# Patient Record
Sex: FEMALE | Race: WHITE | Employment: UNEMPLOYED | URBAN - METROPOLITAN AREA
[De-identification: names, ages, dates, MRNs, and addresses within clinical notes are randomized per-mention and may not be internally consistent; named-entity substitution may affect disease eponyms.]

---

## 2019-04-22 ENCOUNTER — IP HISTORICAL/CONVERTED ENCOUNTER (OUTPATIENT)
Dept: OTHER | Age: 24
End: 2019-04-22

## 2020-06-16 LAB — PAP SMEAR, EXTERNAL: NORMAL

## 2020-07-29 ENCOUNTER — NURSE TRIAGE (OUTPATIENT)
Dept: OBGYN CLINIC | Age: 25
End: 2020-07-29

## 2020-07-29 NOTE — TELEPHONE ENCOUNTER
Patient called stating she stopped getting the Depo Provera injection about 2 months ago and hasn't restarted her cycle. States she took a negative home pregnancy test yesterday. Per Dr Pal Laguna nurse, patient advised to see if her cycle will restart within the next month and if not call our office back.

## 2020-08-15 PROBLEM — F41.9 ANXIETY: Status: ACTIVE | Noted: 2020-08-15

## 2020-08-15 PROBLEM — R87.619 ABNORMAL PAP SMEAR OF CERVIX: Status: ACTIVE | Noted: 2020-08-15

## 2020-08-15 RX ORDER — MEDROXYPROGESTERONE ACETATE 150 MG/ML
150 INJECTION, SUSPENSION INTRAMUSCULAR
COMMUNITY
End: 2021-03-09

## 2020-08-15 RX ORDER — FLUTICASONE PROPIONATE 50 MCG
SPRAY, SUSPENSION (ML) NASAL
COMMUNITY

## 2020-08-17 ENCOUNTER — OFFICE VISIT (OUTPATIENT)
Dept: OBGYN CLINIC | Age: 25
End: 2020-08-17
Payer: COMMERCIAL

## 2020-08-17 DIAGNOSIS — N91.2 AMENORRHEA: Primary | ICD-10-CM

## 2020-08-17 DIAGNOSIS — R87.612 LGSIL ON PAP SMEAR OF CERVIX: ICD-10-CM

## 2020-08-17 LAB
HCG URINE, QL. (POC): NEGATIVE
VALID INTERNAL CONTROL?: YES

## 2020-08-17 PROCEDURE — 81025 URINE PREGNANCY TEST: CPT | Performed by: OBSTETRICS & GYNECOLOGY

## 2020-08-17 PROCEDURE — 99213 OFFICE O/P EST LOW 20 MIN: CPT | Performed by: OBSTETRICS & GYNECOLOGY

## 2020-08-17 PROCEDURE — 57454 BX/CURETT OF CERVIX W/SCOPE: CPT | Performed by: OBSTETRICS & GYNECOLOGY

## 2020-08-17 NOTE — PROGRESS NOTES
Subjective:  Chief Complaints:        1. Recent abnormal pap smear. 2. Here for Colposcopy.:    HPI:         Ethan Conklin is a 21 y.o. female who came to today for colposcopy after abnormal pap smear findings. Patient is doing well today and has no complaints. ROS:  RESPIRATORY:     Negative for shortness of breath, chest pain, chest congestion, cough. CARDIOLOGY:    Negative for dizziness, chest pain, palpitations. FEMALE REPRODUCTIVE:    Negative for abnormal vaginal discharge, abnormal vaginal bleeding. UROLOGY:    Negative for difficulty urinating, voiding dysfunction, frequent urination, dysuria. Gyn History:    OB History:  OB History    Para Term  AB Living   2 2 2     2   SAB TAB Ectopic Molar Multiple Live Births             2      # Outcome Date GA Lbr Krsi/2nd Weight Sex Delivery Anes PTL Lv   2 Term 19    M Vag-Forceps   MARY   1 Term 02/20/15    M Vag-Forceps   MARY       Current Outpatient Medications   Medication Sig    fluticasone propionate (FLONASE) 50 mcg/actuation nasal spray fluticasone propionate 50 mcg/actuation nasal spray,suspension    medroxyPROGESTERone (DEPO-PROVERA) 150 mg/mL injection 150 mg by IntraMUSCular route. No current facility-administered medications for this visit. Objective:  Physical Examination:  GENERAL:     General Appearance: well-appearing, well-developed, no acute distress. Hygiene: unremarkable. Mental Status:  alert and oriented. Mood/Affect:  pleasant. Speech: unremarkable. HEART:     Rhythm:  regular. Rate:  normal.  LUNGS:     Auscultation:  CTA bilaterally, no wheezing/rhonchi/rales. ABDOMEN:       Guarding:  none. Tenderness:  none. Masses:  none palpable. Inguinal nodes:  not enlarged. Ascites:  none. Bowel sounds:  normoactive. Distention:  none. Rebound tenderness:  none. RECTUM:     Anus:  no fissures, unremarkable.   GENITOURINARY - FEMALE:     Vagina:  pink/moist mucosa, no gross evidence of lesions, no abnormal discharge. Cervix/cuff: normal appearance, no lesions/discharge/bleeding:  Colposcopy performed: see Procedure. External genitalia: normal.    Assessment:  The primary encounter diagnosis was Amenorrhea. A diagnosis of LGSIL on Pap smear of cervix was also pertinent to this visit. Plan:  Pap smear results reviewed with pt. Colposcopy done today. Discussed importance of strict followup and to avoid smoking: Depending on severity, followup may be every 4-6 months or sooner if higher grade and may need immediate treatment. Goal is to prevent progression to Cervical Cancer. Discussed immunes system boosters such as adequate sleep; daily multivitamins, diet rich in wholesome nutritional foods and antioxidants and safe sex practices. Patient expressed understanding; All questions answered. Procedures:  Colposcopy:  Informed consent Risk, complications, benefits and alternatives discussed with patient, Consent obtained, 30 second time out taken. Pregnancy status negative. Negative Pregnancy Test.  Colposcopy procedure Acetic Acid 4-6% solution applied to cervix, Endocervical Curreting was performed, Biopsy(ies) taken at 3 o'clock. ,Monsels paste applied to biopsy site(S),. Post Colposcopy Hemostasis was assured, Patient tolerated the procedure well, Instructed nothing in vagina for 4-6 days, Stressed importance of strict followup, Discussed importance of NOT SMOKING. Gardasil discussed in patients under 32years old, discussed that Gardasil (HPV Vaccine) can still offer protection against up to 70% of HPV types tht cause wqrts or cervical cancer. Orders Placed This Encounter    COLPOSC,CERVIX W/ADJ VAG,W/BX & CURRETAG    AMB POC URINE PREGNANCY TEST, VISUAL COLOR COMPARISON       Preventive:  If < 32years old, discussed HPV/Cervical CA prevention;  Implications of Gardasil Vaccine and May prevent infection of HPV types that cause 70% of warts or cervical dysplasias, however, Yearly PAPs with HPV DNA testing is still necessary. This can be inquired at [facility] Dept.; Discussed need to quit smoking if smoker. Discussed importance of strict followup PAPs and colposcopies as recommended.     Follow Up:

## 2020-08-27 LAB
CPT CODES, 490044: NORMAL
CPT DISCLAIMER: NORMAL
CYTOLOGY SPEC DOC CYTO: NORMAL
DIAGNOSIS SYNOPSIS:: NORMAL
DX ICD CODE: NORMAL
PATH REPORT.GROSS SPEC: NORMAL
PATHOLOGIST NAME: NORMAL
PDF IMAGE, 807507: NORMAL
SPECIMEN SOURCE: NORMAL

## 2020-08-28 ENCOUNTER — TELEPHONE (OUTPATIENT)
Dept: OBGYN CLINIC | Age: 25
End: 2020-08-28

## 2020-08-28 NOTE — TELEPHONE ENCOUNTER
----- Message from Juwan Lua MD sent at 8/28/2020  9:52 AM EDT -----  PLEASE NOTIFY PATIENT OF NORMAL RESULTS.

## 2021-02-08 ENCOUNTER — INITIAL PRENATAL (OUTPATIENT)
Dept: OBGYN CLINIC | Age: 26
End: 2021-02-08
Payer: COMMERCIAL

## 2021-02-08 VITALS
DIASTOLIC BLOOD PRESSURE: 70 MMHG | SYSTOLIC BLOOD PRESSURE: 124 MMHG | WEIGHT: 169 LBS | HEIGHT: 61 IN | BODY MASS INDEX: 31.91 KG/M2

## 2021-02-08 DIAGNOSIS — Z3A.11 11 WEEKS GESTATION OF PREGNANCY: ICD-10-CM

## 2021-02-08 DIAGNOSIS — O21.9 NAUSEA AND VOMITING IN PREGNANCY: ICD-10-CM

## 2021-02-08 DIAGNOSIS — Z34.91 NORMAL PREGNANCY IN FIRST TRIMESTER: ICD-10-CM

## 2021-02-08 DIAGNOSIS — N91.2 AMENORRHEA: Primary | ICD-10-CM

## 2021-02-08 PROCEDURE — 76801 OB US < 14 WKS SINGLE FETUS: CPT | Performed by: OBSTETRICS & GYNECOLOGY

## 2021-02-08 PROCEDURE — 0500F INITIAL PRENATAL CARE VISIT: CPT | Performed by: OBSTETRICS & GYNECOLOGY

## 2021-02-08 RX ORDER — PROMETHAZINE HYDROCHLORIDE 25 MG/1
25 TABLET ORAL
Qty: 20 TAB | Refills: 2 | Status: SHIPPED | OUTPATIENT
Start: 2021-02-08 | End: 2021-08-28

## 2021-02-08 NOTE — PROGRESS NOTES
Ketones:NEG // SG:NEG //  Lauretha Lesches //  Ph:NEG //  Nit:NEG // Leuk: NEG   Pt presents with complaints of fatigue and nausea and vomiting//OB PAP, OB PANEL TODAY//[place]

## 2021-02-11 LAB
C TRACH RRNA CVX QL NAA+PROBE: NEGATIVE
CYTOLOGIST CVX/VAG CYTO: NORMAL
CYTOLOGY CVX/VAG DOC CYTO: NORMAL
CYTOLOGY CVX/VAG DOC THIN PREP: NORMAL
DX ICD CODE: NORMAL
LABCORP, 190119: NORMAL
Lab: NORMAL
N GONORRHOEA RRNA CVX QL NAA+PROBE: NEGATIVE
OTHER STN SPEC: NORMAL
STAT OF ADQ CVX/VAG CYTO-IMP: NORMAL
T VAGINALIS RRNA SPEC QL NAA+PROBE: NEGATIVE

## 2021-02-12 LAB
ABO GROUP BLD: NORMAL
BASOPHILS # BLD AUTO: 0 X10E3/UL (ref 0–0.2)
BASOPHILS NFR BLD AUTO: 0 %
BLD GP AB SCN SERPL QL: NEGATIVE
CFTR MUT ANL BLD/T: NORMAL
EOSINOPHIL # BLD AUTO: 0.1 X10E3/UL (ref 0–0.4)
EOSINOPHIL NFR BLD AUTO: 1 %
ERYTHROCYTE [DISTWIDTH] IN BLOOD BY AUTOMATED COUNT: 12.7 % (ref 11.7–15.4)
GENE DIS ANL CARRIER INTERP-IMP: NORMAL
HBV SURFACE AG SERPL QL IA: NEGATIVE
HCT VFR BLD AUTO: 40.1 % (ref 34–46.6)
HCV AB S/CO SERPL IA: <0.1 S/CO RATIO (ref 0–0.9)
HCV AB SERPL QL IA: NORMAL
HGB A MFR BLD: 97.7 % (ref 96.4–98.8)
HGB A2 MFR BLD COLUMN CHROM: 2.3 % (ref 1.8–3.2)
HGB BLD-MCNC: 13.8 G/DL (ref 11.1–15.9)
HGB C MFR BLD: 0 %
HGB F MFR BLD: 0 % (ref 0–2)
HGB FRACT BLD-IMP: NORMAL
HGB OTHER MFR BLD HPLC: 0 %
HGB S BLD QL SOLY: NEGATIVE
HGB S MFR BLD: 0 %
HIV 1+2 AB+HIV1 P24 AG SERPL QL IA: NON REACTIVE
IMM GRANULOCYTES # BLD AUTO: 0 X10E3/UL (ref 0–0.1)
IMM GRANULOCYTES NFR BLD AUTO: 0 %
LYMPHOCYTES # BLD AUTO: 1.8 X10E3/UL (ref 0.7–3.1)
LYMPHOCYTES NFR BLD AUTO: 26 %
MCH RBC QN AUTO: 30.3 PG (ref 26.6–33)
MCHC RBC AUTO-ENTMCNC: 34.4 G/DL (ref 31.5–35.7)
MCV RBC AUTO: 88 FL (ref 79–97)
MONOCYTES # BLD AUTO: 0.4 X10E3/UL (ref 0.1–0.9)
MONOCYTES NFR BLD AUTO: 6 %
NEUTROPHILS # BLD AUTO: 4.6 X10E3/UL (ref 1.4–7)
NEUTROPHILS NFR BLD AUTO: 67 %
PLATELET # BLD AUTO: 248 X10E3/UL (ref 150–450)
RBC # BLD AUTO: 4.56 X10E6/UL (ref 3.77–5.28)
RH BLD: POSITIVE
RPR SER QL: NON REACTIVE
RUBV IGG SERPL IA-ACNC: 10.2 INDEX
WBC # BLD AUTO: 6.9 X10E3/UL (ref 3.4–10.8)

## 2021-03-10 ENCOUNTER — ROUTINE PRENATAL (OUTPATIENT)
Dept: OBGYN CLINIC | Age: 26
End: 2021-03-10
Payer: COMMERCIAL

## 2021-03-10 VITALS
HEART RATE: 120 BPM | DIASTOLIC BLOOD PRESSURE: 91 MMHG | SYSTOLIC BLOOD PRESSURE: 132 MMHG | WEIGHT: 143.13 LBS | TEMPERATURE: 97.1 F | BODY MASS INDEX: 27.02 KG/M2 | HEIGHT: 61 IN

## 2021-03-10 DIAGNOSIS — Z34.92 NORMAL PREGNANCY IN SECOND TRIMESTER: ICD-10-CM

## 2021-03-10 DIAGNOSIS — Z3A.15 15 WEEKS GESTATION OF PREGNANCY: ICD-10-CM

## 2021-03-10 DIAGNOSIS — Z36.1 ANTENATAL SCREENING FOR RAISED ALPHAFETOPROTEIN LEVEL: Primary | ICD-10-CM

## 2021-03-10 PROBLEM — Z34.90 PREGNANCY: Status: ACTIVE | Noted: 2021-03-10

## 2021-03-10 PROCEDURE — 0502F SUBSEQUENT PRENATAL CARE: CPT | Performed by: OBSTETRICS & GYNECOLOGY

## 2021-03-10 NOTE — PROGRESS NOTES
Ketones:NEG // SG:NEG //  Deborah Rand //  Ph:NEG //  Nit:NEG // Leuk: NEG   Pt presents with complaints of nausea and sinus infection, taking antibiotics from [facility]//AFP TODAY//[place]

## 2021-03-12 LAB
2ND TRIMESTER 4 SCREEN SERPL-IMP: NORMAL
2ND TRIMESTER 4 SCREEN SERPL-IMP: NORMAL
AFP ADJ MOM SERPL: 0.96
AFP SERPL-MCNC: 30.7 NG/ML
AGE AT DELIVERY: 26.8 YR
COMMENTS, 018014: NORMAL
FET TS 18 RISK FROM MAT AGE: NORMAL
FET TS 21 RISK FROM MAT AGE: 936
GA METHOD: NORMAL
GA: 15.5 WEEKS
HCG ADJ MOM SERPL: 0.68
HCG SERPL-ACNC: NORMAL MIU/ML
IDDM PATIENT QL: NO
INHIBIN A ADJ MOM SERPL: 0.56
INHIBIN A SERPL-MCNC: 98.21 PG/ML
MULTIPLE PREGNANCY: NO
NEURAL TUBE DEFECT RISK FETUS: NORMAL %
RESULTS, 017389: NORMAL
TS 18 RISK FETUS: NORMAL
TS 21 RISK FETUS: 6497
U ESTRIOL ADJ MOM SERPL: 0.65
U ESTRIOL SERPL-MCNC: 0.57 NG/ML

## 2021-06-09 LAB
ANTIBODY SCREEN, EXTERNAL: NEGATIVE
CHLAMYDIA, EXTERNAL: NEGATIVE
GTT, 1 HR, GLUCOLA, EXTERNAL: 131
HBSAG, EXTERNAL: NORMAL
HIV, EXTERNAL: NON REACTIVE
N. GONORRHEA, EXTERNAL: NEGATIVE
RUBELLA, EXTERNAL: NORMAL
T. PALLIDUM, EXTERNAL: NEGATIVE
TYPE, ABO & RH, EXTERNAL: NORMAL

## 2021-08-06 LAB — GRBS, EXTERNAL: NEGATIVE

## 2021-08-24 ENCOUNTER — HOSPITAL ENCOUNTER (OUTPATIENT)
Dept: LAB | Age: 26
Discharge: HOME | End: 2021-08-24
Payer: COMMERCIAL

## 2021-08-24 ENCOUNTER — TRANSCRIBE ORDER (OUTPATIENT)
Dept: REGISTRATION | Age: 26
End: 2021-08-24

## 2021-08-24 DIAGNOSIS — Z01.812 PRE-PROCEDURAL LABORATORY EXAMINATIONS: ICD-10-CM

## 2021-08-24 DIAGNOSIS — Z01.812 PRE-PROCEDURAL LABORATORY EXAMINATIONS: Primary | ICD-10-CM

## 2021-08-24 PROCEDURE — U0003 INFECTIOUS AGENT DETECTION BY NUCLEIC ACID (DNA OR RNA); SEVERE ACUTE RESPIRATORY SYNDROME CORONAVIRUS 2 (SARS-COV-2) (CORONAVIRUS DISEASE [COVID-19]), AMPLIFIED PROBE TECHNIQUE, MAKING USE OF HIGH THROUGHPUT TECHNOLOGIES AS DESCRIBED BY CMS-2020-01-R: HCPCS

## 2021-08-25 ENCOUNTER — HOSPITAL ENCOUNTER (INPATIENT)
Age: 26
LOS: 3 days | Discharge: HOME | End: 2021-08-28
Attending: OBSTETRICS & GYNECOLOGY | Admitting: OBSTETRICS & GYNECOLOGY
Payer: COMMERCIAL

## 2021-08-25 ENCOUNTER — ANESTHESIA EVENT (OUTPATIENT)
Dept: LABOR AND DELIVERY | Age: 26
End: 2021-08-25
Payer: COMMERCIAL

## 2021-08-25 ENCOUNTER — ANESTHESIA (OUTPATIENT)
Dept: LABOR AND DELIVERY | Age: 26
End: 2021-08-25
Payer: COMMERCIAL

## 2021-08-25 LAB
COVID-19 RAPID TEST, COVR: NOT DETECTED
ERYTHROCYTE [DISTWIDTH] IN BLOOD BY AUTOMATED COUNT: 13.7 % (ref 11.5–14.5)
HCT VFR BLD AUTO: 36.8 % (ref 35–47)
HGB BLD-MCNC: 10.3 G/DL (ref 11.5–16)
MCH RBC QN AUTO: 24.8 PG (ref 26–34)
MCHC RBC AUTO-ENTMCNC: 28 G/DL (ref 30–36.5)
MCV RBC AUTO: 88.5 FL (ref 80–99)
NRBC # BLD: 0 K/UL (ref 0–0.01)
NRBC BLD-RTO: 0 PER 100 WBC
PLATELET # BLD AUTO: 266 K/UL (ref 150–400)
PMV BLD AUTO: 9.1 FL (ref 8.9–12.9)
RBC # BLD AUTO: 4.16 M/UL (ref 3.8–5.2)
SARS-COV-2, XPLCVT: NOT DETECTED
SOURCE, COVRS: NORMAL
SOURCE, COVRS: NORMAL
WBC # BLD AUTO: 9.7 K/UL (ref 3.6–11)

## 2021-08-25 PROCEDURE — 77030014125 HC TY EPDRL BBMI -B: Performed by: ANESTHESIOLOGY

## 2021-08-25 PROCEDURE — 74011000250 HC RX REV CODE- 250: Performed by: ANESTHESIOLOGY

## 2021-08-25 PROCEDURE — 96365 THER/PROPH/DIAG IV INF INIT: CPT

## 2021-08-25 PROCEDURE — 96374 THER/PROPH/DIAG INJ IV PUSH: CPT

## 2021-08-25 PROCEDURE — 65270000029 HC RM PRIVATE

## 2021-08-25 PROCEDURE — 96361 HYDRATE IV INFUSION ADD-ON: CPT

## 2021-08-25 PROCEDURE — 99285 EMERGENCY DEPT VISIT HI MDM: CPT

## 2021-08-25 PROCEDURE — 36415 COLL VENOUS BLD VENIPUNCTURE: CPT

## 2021-08-25 PROCEDURE — 74011250636 HC RX REV CODE- 250/636: Performed by: NURSE PRACTITIONER

## 2021-08-25 PROCEDURE — 87635 SARS-COV-2 COVID-19 AMP PRB: CPT

## 2021-08-25 PROCEDURE — 96360 HYDRATION IV INFUSION INIT: CPT

## 2021-08-25 PROCEDURE — 85027 COMPLETE CBC AUTOMATED: CPT

## 2021-08-25 PROCEDURE — 00HU33Z INSERTION OF INFUSION DEVICE INTO SPINAL CANAL, PERCUTANEOUS APPROACH: ICD-10-PCS | Performed by: ANESTHESIOLOGY

## 2021-08-25 PROCEDURE — 74011000258 HC RX REV CODE- 258: Performed by: NURSE PRACTITIONER

## 2021-08-25 PROCEDURE — 74011250636 HC RX REV CODE- 250/636: Performed by: OBSTETRICS & GYNECOLOGY

## 2021-08-25 PROCEDURE — 74011250636 HC RX REV CODE- 250/636: Performed by: ANESTHESIOLOGY

## 2021-08-25 PROCEDURE — 75410000002 HC LABOR FEE PER 1 HR: Performed by: OBSTETRICS & GYNECOLOGY

## 2021-08-25 PROCEDURE — 2709999900 HC NON-CHARGEABLE SUPPLY

## 2021-08-25 PROCEDURE — 75810000275 HC EMERGENCY DEPT VISIT NO LEVEL OF CARE

## 2021-08-25 PROCEDURE — 59025 FETAL NON-STRESS TEST: CPT

## 2021-08-25 PROCEDURE — 77030005513 HC CATH URETH FOL11 MDII -B

## 2021-08-25 RX ORDER — OXYTOCIN/RINGER'S LACTATE 30/500 ML
0-20 PLASTIC BAG, INJECTION (ML) INTRAVENOUS
Status: DISCONTINUED | OUTPATIENT
Start: 2021-08-25 | End: 2021-08-26

## 2021-08-25 RX ORDER — MORPHINE SULFATE 10 MG/ML
10 INJECTION, SOLUTION INTRAMUSCULAR; INTRAVENOUS ONCE
Status: DISCONTINUED | OUTPATIENT
Start: 2021-08-25 | End: 2021-08-25

## 2021-08-25 RX ORDER — SODIUM CHLORIDE, SODIUM LACTATE, POTASSIUM CHLORIDE, CALCIUM CHLORIDE 600; 310; 30; 20 MG/100ML; MG/100ML; MG/100ML; MG/100ML
125 INJECTION, SOLUTION INTRAVENOUS CONTINUOUS
Status: DISCONTINUED | OUTPATIENT
Start: 2021-08-25 | End: 2021-08-26

## 2021-08-25 RX ORDER — SODIUM CHLORIDE 0.9 % (FLUSH) 0.9 %
5-40 SYRINGE (ML) INJECTION AS NEEDED
Status: CANCELLED | OUTPATIENT
Start: 2021-08-25

## 2021-08-25 RX ORDER — ONDANSETRON 2 MG/ML
INJECTION INTRAMUSCULAR; INTRAVENOUS
Status: DISPENSED
Start: 2021-08-25 | End: 2021-08-26

## 2021-08-25 RX ORDER — BUTORPHANOL TARTRATE 2 MG/ML
2 INJECTION INTRAMUSCULAR; INTRAVENOUS
Status: DISCONTINUED | OUTPATIENT
Start: 2021-08-25 | End: 2021-08-28 | Stop reason: HOSPADM

## 2021-08-25 RX ORDER — EPHEDRINE SULFATE/0.9% NACL/PF 50 MG/5 ML
12.5 SYRINGE (ML) INTRAVENOUS
Status: DISCONTINUED | OUTPATIENT
Start: 2021-08-25 | End: 2021-08-26

## 2021-08-25 RX ORDER — BUPIVACAINE HYDROCHLORIDE 2.5 MG/ML
INJECTION, SOLUTION EPIDURAL; INFILTRATION; INTRACAUDAL AS NEEDED
Status: DISCONTINUED | OUTPATIENT
Start: 2021-08-25 | End: 2021-08-26 | Stop reason: HOSPADM

## 2021-08-25 RX ORDER — OXYTOCIN/RINGER'S LACTATE 30/500 ML
87.3 PLASTIC BAG, INJECTION (ML) INTRAVENOUS AS NEEDED
Status: CANCELLED | OUTPATIENT
Start: 2021-08-25

## 2021-08-25 RX ORDER — NALOXONE HYDROCHLORIDE 0.4 MG/ML
0.4 INJECTION, SOLUTION INTRAMUSCULAR; INTRAVENOUS; SUBCUTANEOUS AS NEEDED
Status: DISCONTINUED | OUTPATIENT
Start: 2021-08-25 | End: 2021-08-26 | Stop reason: HOSPADM

## 2021-08-25 RX ORDER — LIDOCAINE HYDROCHLORIDE AND EPINEPHRINE 20; 5 MG/ML; UG/ML
INJECTION, SOLUTION EPIDURAL; INFILTRATION; INTRACAUDAL; PERINEURAL AS NEEDED
Status: DISCONTINUED | OUTPATIENT
Start: 2021-08-25 | End: 2021-08-26 | Stop reason: HOSPADM

## 2021-08-25 RX ORDER — CEFAZOLIN SODIUM 1 G/3ML
INJECTION, POWDER, FOR SOLUTION INTRAMUSCULAR; INTRAVENOUS
Status: DISPENSED
Start: 2021-08-25 | End: 2021-08-26

## 2021-08-25 RX ORDER — LIDOCAINE HYDROCHLORIDE 10 MG/ML
10 INJECTION INFILTRATION; PERINEURAL AS NEEDED
Status: DISCONTINUED | OUTPATIENT
Start: 2021-08-25 | End: 2021-08-26 | Stop reason: HOSPADM

## 2021-08-25 RX ORDER — SODIUM CHLORIDE 0.9 % (FLUSH) 0.9 %
5-40 SYRINGE (ML) INJECTION EVERY 8 HOURS
Status: DISCONTINUED | OUTPATIENT
Start: 2021-08-25 | End: 2021-08-26

## 2021-08-25 RX ORDER — OXYTOCIN/RINGER'S LACTATE 30/500 ML
10 PLASTIC BAG, INJECTION (ML) INTRAVENOUS AS NEEDED
Status: CANCELLED | OUTPATIENT
Start: 2021-08-25

## 2021-08-25 RX ORDER — NALOXONE HYDROCHLORIDE 0.4 MG/ML
0.4 INJECTION, SOLUTION INTRAMUSCULAR; INTRAVENOUS; SUBCUTANEOUS AS NEEDED
Status: DISCONTINUED | OUTPATIENT
Start: 2021-08-25 | End: 2021-08-26

## 2021-08-25 RX ORDER — MAG HYDROX/ALUMINUM HYD/SIMETH 200-200-20
30 SUSPENSION, ORAL (FINAL DOSE FORM) ORAL
Status: DISCONTINUED | OUTPATIENT
Start: 2021-08-25 | End: 2021-08-26 | Stop reason: HOSPADM

## 2021-08-25 RX ORDER — SODIUM CHLORIDE 0.9 % (FLUSH) 0.9 %
5-40 SYRINGE (ML) INJECTION EVERY 8 HOURS
Status: CANCELLED | OUTPATIENT
Start: 2021-08-25

## 2021-08-25 RX ORDER — WATER FOR INJECTION,STERILE
VIAL (ML) INJECTION
Status: DISPENSED
Start: 2021-08-25 | End: 2021-08-26

## 2021-08-25 RX ORDER — ONDANSETRON 2 MG/ML
4 INJECTION INTRAMUSCULAR; INTRAVENOUS
Status: DISCONTINUED | OUTPATIENT
Start: 2021-08-25 | End: 2021-08-26

## 2021-08-25 RX ORDER — SODIUM CHLORIDE 0.9 % (FLUSH) 0.9 %
5-40 SYRINGE (ML) INJECTION AS NEEDED
Status: DISCONTINUED | OUTPATIENT
Start: 2021-08-25 | End: 2021-08-26

## 2021-08-25 RX ORDER — FENTANYL/BUPIVACAINE/NS/PF 2-1250MCG
1-16 PREFILLED PUMP RESERVOIR EPIDURAL CONTINUOUS
Status: DISCONTINUED | OUTPATIENT
Start: 2021-08-25 | End: 2021-08-26

## 2021-08-25 RX ORDER — SODIUM CHLORIDE, SODIUM LACTATE, POTASSIUM CHLORIDE, CALCIUM CHLORIDE 600; 310; 30; 20 MG/100ML; MG/100ML; MG/100ML; MG/100ML
125 INJECTION, SOLUTION INTRAVENOUS CONTINUOUS
Status: CANCELLED | OUTPATIENT
Start: 2021-08-25

## 2021-08-25 RX ORDER — FENTANYL CITRATE 50 UG/ML
100 INJECTION, SOLUTION INTRAMUSCULAR; INTRAVENOUS ONCE
Status: COMPLETED | OUTPATIENT
Start: 2021-08-25 | End: 2021-08-25

## 2021-08-25 RX ADMIN — Medication 10 ML: at 01:43

## 2021-08-25 RX ADMIN — SODIUM CHLORIDE, POTASSIUM CHLORIDE, SODIUM LACTATE AND CALCIUM CHLORIDE 125 ML/HR: 600; 310; 30; 20 INJECTION, SOLUTION INTRAVENOUS at 01:46

## 2021-08-25 RX ADMIN — OXYTOCIN 8 MILLI-UNITS/MIN: 10 INJECTION, SOLUTION INTRAMUSCULAR; INTRAVENOUS at 14:25

## 2021-08-25 RX ADMIN — OXYTOCIN 2 MILLI-UNITS/MIN: 10 INJECTION, SOLUTION INTRAMUSCULAR; INTRAVENOUS at 11:32

## 2021-08-25 RX ADMIN — SODIUM CHLORIDE, POTASSIUM CHLORIDE, SODIUM LACTATE AND CALCIUM CHLORIDE 125 ML/HR: 600; 310; 30; 20 INJECTION, SOLUTION INTRAVENOUS at 04:23

## 2021-08-25 RX ADMIN — FENTANYL CITRATE 100 MCG: 50 INJECTION, SOLUTION INTRAMUSCULAR; INTRAVENOUS at 13:23

## 2021-08-25 RX ADMIN — ONDANSETRON 4 MG: 2 INJECTION INTRAMUSCULAR; INTRAVENOUS at 20:52

## 2021-08-25 RX ADMIN — BUPIVACAINE HYDROCHLORIDE 4 ML: 2.5 INJECTION, SOLUTION EPIDURAL; INFILTRATION; INTRACAUDAL; PERINEURAL at 13:42

## 2021-08-25 RX ADMIN — Medication 10 ML/HR: at 14:22

## 2021-08-25 RX ADMIN — LIDOCAINE HYDROCHLORIDE,EPINEPHRINE BITARTRATE 3 ML: 20; .005 INJECTION, SOLUTION EPIDURAL; INFILTRATION; INTRACAUDAL; PERINEURAL at 13:42

## 2021-08-25 RX ADMIN — OXYTOCIN 4 MILLI-UNITS/MIN: 10 INJECTION, SOLUTION INTRAMUSCULAR; INTRAVENOUS at 12:03

## 2021-08-25 RX ADMIN — SODIUM CHLORIDE, POTASSIUM CHLORIDE, SODIUM LACTATE AND CALCIUM CHLORIDE 999 ML/HR: 600; 310; 30; 20 INJECTION, SOLUTION INTRAVENOUS at 12:45

## 2021-08-25 RX ADMIN — SODIUM CHLORIDE, POTASSIUM CHLORIDE, SODIUM LACTATE AND CALCIUM CHLORIDE 125 ML/HR: 600; 310; 30; 20 INJECTION, SOLUTION INTRAVENOUS at 11:28

## 2021-08-25 RX ADMIN — SODIUM CHLORIDE 12.5 MG: 9 INJECTION, SOLUTION INTRAVENOUS at 01:55

## 2021-08-25 RX ADMIN — SODIUM CHLORIDE, POTASSIUM CHLORIDE, SODIUM LACTATE AND CALCIUM CHLORIDE 125 ML/HR: 600; 310; 30; 20 INJECTION, SOLUTION INTRAVENOUS at 19:00

## 2021-08-25 RX ADMIN — SODIUM CHLORIDE, POTASSIUM CHLORIDE, SODIUM LACTATE AND CALCIUM CHLORIDE 125 ML/HR: 600; 310; 30; 20 INJECTION, SOLUTION INTRAVENOUS at 13:05

## 2021-08-25 RX ADMIN — SODIUM CHLORIDE, POTASSIUM CHLORIDE, SODIUM LACTATE AND CALCIUM CHLORIDE 999 ML/HR: 600; 310; 30; 20 INJECTION, SOLUTION INTRAVENOUS at 13:58

## 2021-08-25 RX ADMIN — OXYTOCIN 6 MILLI-UNITS/MIN: 10 INJECTION, SOLUTION INTRAMUSCULAR; INTRAVENOUS at 12:32

## 2021-08-25 RX ADMIN — Medication 10 ML/HR: at 22:21

## 2021-08-25 RX ADMIN — SODIUM CHLORIDE, POTASSIUM CHLORIDE, SODIUM LACTATE AND CALCIUM CHLORIDE 125 ML/HR: 600; 310; 30; 20 INJECTION, SOLUTION INTRAVENOUS at 14:06

## 2021-08-25 RX ADMIN — BUTORPHANOL TARTRATE 2 MG: 2 INJECTION, SOLUTION INTRAMUSCULAR; INTRAVENOUS at 01:57

## 2021-08-25 NOTE — PROGRESS NOTES
1245 Epidural requested. LR bolus started for epidural.    3019 Spoke with Anesthesia about patient status. Instructed to give 1cc of fentanyl to help with patients pain until he can come place the epidural    1257 Spoke with Dr. Tolu quinteros patient status and pain . 1330 Anesthesia at bedside for epdirual placement. 1333 Sitting up for epidural     1345 epidural complete.

## 2021-08-25 NOTE — PROGRESS NOTES
Labor Note    S: Pt feeling comfortable s/p epidural.     O:  Visit Vitals  BP (!) 91/55   Pulse 71   Temp 98 °F (36.7 °C)   Resp 20   Ht 5' 1\" (1.549 m)   Wt 69.9 kg (154 lb)   LMP 11/20/2020 (Exact Date)   SpO2 100%   Breastfeeding No   BMI 29.10 kg/m²     Gen- NAD  Abdomen- soft, gravid, NT  Cervix- unchanged 3/70/-2  AROM performed with return of clear fluid  [place]- ctx q3 min, pit at 10 mU/min  FHTs- category 1     A/P: 30 y/o [id number] with IUP at 39 5/7 wks in early labor.  GBS neg.   -Maternal well-being: VSS, comfortable with epidural  -Fetal well-being: continuous monitoring, cat 1 tracing  -Labor: no change thus far but now s/p arom, continue pitocin protocol    Reassess in 3-4 hours or sooner if CI.

## 2021-08-25 NOTE — PROGRESS NOTES
Labor Note    Pt seen and examined and electronic chart was reviewed. Lilliana Simons is a 30 y/o  with IUP at 44 5/7 wks who presented with contractions and found to be in latent labor. Cervix remains unchanged at 3/60/-3. Pregnancy has been uncomplicated. GBS neg status. Pt is uncomfortable and desires epidural. Pelvis proven to 7 lbs 1 oz. FHTs category 1. Contractions irregular. Visit Vitals  /73   Pulse 79   Temp 98.2 °F (36.8 °C)   Resp 16   Ht 5' 1\" (1.549 m)   Wt 69.9 kg (154 lb)   LMP 2020 (Exact Date)   SpO2 98%   Breastfeeding No   BMI 29.10 kg/m²     Discussed discharge with awaiting spontaneous labor vs. Admission and augmentation of labor. Reviewed R/B- pt desires augmentation. Plan pitocin. AROM at next check.

## 2021-08-25 NOTE — PROGRESS NOTES
12:51 AM  Pt arrives from home c/o contractions since 7pm.  Placed on monitor for NST. SVE 2-3/50/-3.

## 2021-08-25 NOTE — H&P
JOSE History & Physical    Subjective:     Susanne Vo is a 31 y.o. female [id number] SIUP @ 39w5d by Estimated Date of Delivery: 21 who arrives to L&D with chief complaint of uterine contractions for the last several hours that have gotten more intense. She  endorses +FM, intact membranes; Pt denies vaginal bleeding, fever/chills, chest pain, SOB, HA, scotomata, sudden swelling, nor malaise. She reports a benign pregnancy course    Pregnancy problems include:   - Body mass index is 29.1 kg/m². -   Patient Active Problem List   Diagnosis Code    Anxiety F41.9    Abnormal Pap smear of cervix R87.619    Pregnancy Z34.90    Labor without complication F29    Labor and delivery, indication for care O75.9       Allergies  -   Allergies   Allergen Reactions    Amoxicillin Anaphylaxis    Tree Nut Anaphylaxis    Oxycodone-Acetaminophen Other (comments)       Prenatal Labs  GBS negative per pt- records to be retrieved. OB History  OB History        4    Para   2    Term   2       0    AB   1    Living   2       SAB   1    TAB   0    Ectopic   0    Molar   0    Multiple   0    Live Births   2                 PMHx  Past Medical History:   Diagnosis Date    Abnormal Pap smear of cervix 8/15/2020    HPV +    Anxiety 8/15/2020        PSHx  History reviewed. No pertinent surgical history.      F/SHx  Family History   Problem Relation Age of Onset    Hypertension Mother     Breast Cancer Maternal Aunt     Diabetes Maternal Grandfather       Social History     Socioeconomic History    Marital status:      Spouse name: Not on file    Number of children: Not on file    Years of education: Not on file    Highest education level: Not on file   Occupational History    Not on file   Tobacco Use    Smoking status: Never Smoker    Smokeless tobacco: Never Used   Vaping Use    Vaping Use: Never used   Substance and Sexual Activity    Alcohol use: Not Currently    Drug use: Never    Sexual activity: Yes     Birth control/protection: Injection     Comment: Depo-provera   Other Topics Concern     Service Not Asked    Blood Transfusions Not Asked    Caffeine Concern Not Asked    Occupational Exposure Not Asked    Hobby Hazards Not Asked    Sleep Concern Not Asked    Stress Concern Not Asked    Weight Concern Not Asked    Special Diet Not Asked    Back Care Not Asked    Exercise Not Asked    Bike Helmet Not Asked   [address] Not Asked    Self-Exams Not Asked   Social History Narrative    Not on file     Social Determinants of Health     Financial Resource Strain:     Difficulty of Paying Living Expenses:    Food Insecurity:     Worried About Running Out of Food in the Last Year:     [address] in the Last Year:    Transportation Needs:     Lack of Transportation (Medical):  Lack of Transportation (Non-Medical):    Physical Activity:     Days of Exercise per Week:     Minutes of Exercise per Session:    Stress:     Feeling of Stress :    Social Connections:     Frequency of Communication with Friends and Family:     Frequency of Social Gatherings with Friends and Family:     Attends Judaism Services:     Active Member of Clubs or Organizations:     Attends Club or Organization Meetings:     Marital Status:    Intimate Partner Violence:     Fear of Current or Ex-Partner:     Emotionally Abused:     Physically Abused:     Sexually Abused:        Home Medications:  Prior to Admission Medications   Prescriptions Last Dose Informant Patient Reported? Taking? PNV Comb #2-Iron-Omega 3-FA [id number] mg cmpk 8/18/2021 at Unknown time  Yes Yes   Sig: Take  by mouth.    fluticasone propionate (FLONASE) 50 mcg/actuation nasal spray Not Taking at Unknown time  Yes No   Sig: fluticasone propionate 50 mcg/actuation nasal spray,suspension   Patient not taking: Reported on 8/25/2021   promethazine (PHENERGAN) 25 mg tablet Not Taking at Unknown time  No No   Sig: Take 1 Tab by mouth every six (6) hours as needed for Nausea. Patient not taking: Reported on 8/25/2021      Facility-Administered Medications: None        Review of Systems:  A comprehensive review of systems was negative except for that written in the History of Present Illness. Objective:     Vitals:    08/25/21 0327 08/25/21 0332 08/25/21 0337 08/25/21 0342   BP:       Pulse:       Resp:       Temp:       SpO2: 97% 100% 100% 99%   Weight:       Height:          Body mass index is 29.1 kg/m². Physical Exam:  General:  Alert, cooperative, no distress, appears stated age. Lungs:   Clear to auscultation bilaterally. Symmetrical expansion, non-labored   Heart:  Regular rate and rhythm   GI/Abdomen:   Soft, non-tender. Gravid. Extremities: Extremities normal, atraumatic, no cyanosis or edema. Skin: Skin color, texture, turgor normal. No rashes or lesions   /Cervical Exam: no lesions or erythema  Cervix: Cervical Exam  Dilation (cm):  (2-3)  Eff: 50 %  Station: -3  Vaginal exam done by? : ALTON Modi CNM  Membrane Status: Intact  Fetal Presentation: Vertex by Leopolds  Pelvimetry: Adequate  Fetal Size: AGA      Electronic Fetal Monitoring    Fetal Heart Rate:   Baseline 135, moderate variability, positive accels, no decels, cat 1 FHT   Uterine Contractions: External, q2-4min, relaxed to palpation    Assessment:   31 y.o. female G8G1922 SIUP @  39w5 d by HUGO of Estimated Date of Delivery: 8/27/21  Admission diagnoses:   -latent phase labor        Pregnancy problems include:   -   Patient Active Problem List   Diagnosis Code    Anxiety F41.9    Abnormal Pap smear of cervix R87.619    Pregnancy Z34.90    Labor without complication Y42    Labor and delivery, indication for care O75.9     - Body mass index is 29.1 kg/m².     Pertinent Labs:  - A  -GBS negative     EFM:   - Category 1  - NST: Fetal NST Findings: reactive with indication of establishing a baseline fetal evaluation conducted over a minimum of 20 minutes    Plan:   Admit to L&D for labor    Discussed with patient admission to the unit based on gestational age, parity, and maternal distance from the hospital. Pt was advised if she does not make change in subsequent cervical exams, may need augmentation. R/B/SE of medications used with augmentation- Pitocin- discussed. Tests/Labs/Monitoring ordered: Electronic Fetal Monitoring Continuous EFM, SVE, CBC w/o DIF and Sample to blood bank (hold)  GBS negative    Review of prenatal records to include labs/diagnostic testing, encounter notes  Review and sign consents   Patient Education:   - Pain management, as indicated; R/B/SE of IV meds, nitrous oxide, and epidural discussed. - Plan of care      Patient has been counseled regarding this plan of care and is in agreement; all questions answered.     Collaborative MD: Lila Bedolla      Signed By:  Gardenia Johnson CNM      8/25/2021 3:48 AM

## 2021-08-25 NOTE — PROGRESS NOTES
1:16 AM  Spoke to [facility] and informed her of the patients exam. She stated to let the patient ambulate and recheck her in a couple hours. 1:24 AM  Spoke to Dana Casanova again and reviewed the patients contraction   3:31 AM  BREANNA Hawkins at the bedside to reassess the patients cervix. 7:07 AM  SBAR report given to Barry Arellano RN. Care of the patient turned over.

## 2021-08-25 NOTE — ANESTHESIA PROCEDURE NOTES
Epidural Block    Patient location during procedure: OB  Start time: 8/25/2021 1:32 PM  End time: 8/25/2021 1:51 PM  Reason for block: labor epidural  Staffing  Performed: attending   Anesthesiologist: Henry Tapia MD  Preanesthetic Checklist  Completed: patient identified, pre-op evaluation and timeout performed  Block Placement  Patient position: sitting  Prep: Betadine  Sterility prep: mask, hand, gown, gloves, drape and cap  Sedation level: no sedation  Patient monitoring: continuous pulse oximetry and frequent blood pressure checks  Approach: midline  Location: lumbar  Lumbar location: L3-L4  Epidural  Loss of resistance technique: saline  Guidance: landmark technique  Needle  Needle type: Tuohy   Needle gauge: 18 G  Needle length: 10 cm  Catheter size: 20 G  Catheter securement method: clear occlusive dressing and surgical tape  Test dose: negative  Assessment  Procedure assessment: patient tolerated procedure well with no immediate complications

## 2021-08-25 NOTE — PROGRESS NOTES
8/25/2021  10:03 AM    CM spoke with  PAKO via phone to complete initial assessment and begin discharge planning. MOB verified and confirmed demographics. PAKO lives with SHA Shahgh [phone], along with her 10, and 2yr old, at the address on file. PAKO is employed and plans to take 6wks off from work. FOSHANNON is also employed and will be taking 6wks off. PAKO reports she has good family support. PAKO plans to breast feed baby and has pump to use at home. [facility] will provide follow up care for infant. PAKO has car seat, bassinet/crib, clothing, bottles and all necessary supplies for baby. PAKO has [employer] and will be adding baby to this policy. CM discussed process to add baby to insurance, PAKO verbalized understanding. PAKO denied needing WIC/Medicaid services. Care Management Interventions  PCP Verified by CM: Yes Shi Juarez)  Mode of Transport at Discharge:  Other (see comment)  Transition of Care Consult (CM Consult): Discharge Planning  Current Support Network: Own Home, Family Lives Nearby  Confirm Follow Up Transport: Family  Discharge Location  Discharge Placement: Home with family assistance  Jeane Chilel

## 2021-08-25 NOTE — PROGRESS NOTES
1900: Bedside and Verbal shift change report given to XU Rice RN (oncoming nurse) by Niyah Braun RN (offgoing nurse). Report included the following information SBAR, Kardex, Procedure Summary, Intake/Output, MAR and Recent Results. 1910: Dr. Mariano Harrison at bedside to assess patient, pt complaining of constat pressure. SVE per MD 4cms. Pt tolerated procedure and pt turned to left with peanut ball in between her legs. 2214: Dr. Mariano Harrison at bedside to assess patient. SVE per MD 5/80/-1. Pt tolerated procedure. Pt turned to right in knocked knees position. 0012: Dr. Saman Cuellar called for epidural redose. 0030: Dr. Saman Cuellar at bedside for epidural redose. Pt tolerated procedure. Pt turned to right side with peanut ball in between legs. 3397: This RN at bedside to assess patient, pt c/o pain in the left hip. Pads changed. Pt complaining of vaginal pressure asking this RN to check cervix    0250: This Rn preforming SVE per pt request. SVE 6/80/-1. Pt turned to left side and heat pack given. 7465: This RN at bedside to assess patient, pt c/o rectal pressure. SVE per this RN 6-7/80/0. Pt turned to right side with peanut ball in between legs. 5049: Dr. Mariano Harrison at bedside to assess patient, SVE per MD 8/80/0    0700: Bedside and Verbal shift change report given to NATALIE Mitchell RN (oncoming nurse) by XU Rice RN (offgoing nurse). Report included the following information SBAR, Kardex, Procedure Summary, Intake/Output, MAR and Recent Results.

## 2021-08-25 NOTE — PROGRESS NOTES
09:22- MD Jefry called regarding plan of care. MD Bonita will be in to round around 10:00. Strip reviewed. Pt may come off monitor. 09:32- MD Bonita called. MD will be rounding at approx 10am. Plans to perform SVE and may start pitocin if unchanged. 16:45- Pt turned to left side and placed in modified walchers with peanut ball    17:59- Pt turned to right side. Peanut ball positioned between knees. Pt is comfortable. No needs at this time    19:00- OB SBAR report given to A. Delma Klinefelter

## 2021-08-25 NOTE — PROGRESS NOTES
1028: Dr. Jose Bernard performing SVE at this time, 3/60 noted. MD discussing POC with patient. Pt and MD agreeing to augment labor.  MD PEACOCK start pitocin augmentation at this time

## 2021-08-26 PROCEDURE — 74011250637 HC RX REV CODE- 250/637: Performed by: OBSTETRICS & GYNECOLOGY

## 2021-08-26 PROCEDURE — 74011250636 HC RX REV CODE- 250/636: Performed by: NURSE PRACTITIONER

## 2021-08-26 PROCEDURE — 65270000029 HC RM PRIVATE

## 2021-08-26 PROCEDURE — 75410000002 HC LABOR FEE PER 1 HR: Performed by: OBSTETRICS & GYNECOLOGY

## 2021-08-26 PROCEDURE — 75410000000 HC DELIVERY VAGINAL/SINGLE: Performed by: OBSTETRICS & GYNECOLOGY

## 2021-08-26 PROCEDURE — 2709999900 HC NON-CHARGEABLE SUPPLY

## 2021-08-26 PROCEDURE — 74011000250 HC RX REV CODE- 250: Performed by: ANESTHESIOLOGY

## 2021-08-26 PROCEDURE — 76060000078 HC EPIDURAL ANESTHESIA: Performed by: ANESTHESIOLOGY

## 2021-08-26 PROCEDURE — 75410000003 HC RECOV DEL/VAG/CSECN EA 0.5 HR: Performed by: OBSTETRICS & GYNECOLOGY

## 2021-08-26 PROCEDURE — 74011250636 HC RX REV CODE- 250/636: Performed by: OBSTETRICS & GYNECOLOGY

## 2021-08-26 PROCEDURE — 10907ZC DRAINAGE OF AMNIOTIC FLUID, THERAPEUTIC FROM PRODUCTS OF CONCEPTION, VIA NATURAL OR ARTIFICIAL OPENING: ICD-10-PCS | Performed by: OBSTETRICS & GYNECOLOGY

## 2021-08-26 RX ORDER — IBUPROFEN 800 MG/1
800 TABLET ORAL EVERY 8 HOURS
Status: DISCONTINUED | OUTPATIENT
Start: 2021-08-26 | End: 2021-08-28 | Stop reason: HOSPADM

## 2021-08-26 RX ORDER — OXYTOCIN/RINGER'S LACTATE 30/500 ML
10 PLASTIC BAG, INJECTION (ML) INTRAVENOUS AS NEEDED
Status: COMPLETED | OUTPATIENT
Start: 2021-08-26 | End: 2021-08-26

## 2021-08-26 RX ORDER — NALOXONE HYDROCHLORIDE 0.4 MG/ML
0.4 INJECTION, SOLUTION INTRAMUSCULAR; INTRAVENOUS; SUBCUTANEOUS AS NEEDED
Status: DISCONTINUED | OUTPATIENT
Start: 2021-08-26 | End: 2021-08-28 | Stop reason: HOSPADM

## 2021-08-26 RX ORDER — ACETAMINOPHEN 325 MG/1
650 TABLET ORAL
Status: DISCONTINUED | OUTPATIENT
Start: 2021-08-26 | End: 2021-08-28 | Stop reason: HOSPADM

## 2021-08-26 RX ORDER — ZOLPIDEM TARTRATE 5 MG/1
5 TABLET ORAL
Status: DISCONTINUED | OUTPATIENT
Start: 2021-08-26 | End: 2021-08-28 | Stop reason: HOSPADM

## 2021-08-26 RX ORDER — DIPHENHYDRAMINE HCL 25 MG
25 CAPSULE ORAL
Status: DISCONTINUED | OUTPATIENT
Start: 2021-08-26 | End: 2021-08-28 | Stop reason: HOSPADM

## 2021-08-26 RX ORDER — OXYTOCIN/RINGER'S LACTATE 30/500 ML
87.3 PLASTIC BAG, INJECTION (ML) INTRAVENOUS AS NEEDED
Status: DISCONTINUED | OUTPATIENT
Start: 2021-08-26 | End: 2021-08-28 | Stop reason: HOSPADM

## 2021-08-26 RX ORDER — SIMETHICONE 80 MG
80 TABLET,CHEWABLE ORAL
Status: DISCONTINUED | OUTPATIENT
Start: 2021-08-26 | End: 2021-08-28 | Stop reason: HOSPADM

## 2021-08-26 RX ORDER — HYDROCORTISONE ACETATE PRAMOXINE HCL 2.5; 1 G/100G; G/100G
CREAM TOPICAL AS NEEDED
Status: DISCONTINUED | OUTPATIENT
Start: 2021-08-26 | End: 2021-08-26

## 2021-08-26 RX ORDER — ONDANSETRON 4 MG/1
4 TABLET, ORALLY DISINTEGRATING ORAL
Status: DISCONTINUED | OUTPATIENT
Start: 2021-08-26 | End: 2021-08-28 | Stop reason: HOSPADM

## 2021-08-26 RX ORDER — FACIAL-BODY WIPES
10 EACH TOPICAL DAILY PRN
Status: DISCONTINUED | OUTPATIENT
Start: 2021-08-26 | End: 2021-08-28 | Stop reason: HOSPADM

## 2021-08-26 RX ORDER — FOLIC ACID/MULTIVIT,IRON,MINER 0.4MG-18MG
1 TABLET ORAL DAILY
Status: DISCONTINUED | OUTPATIENT
Start: 2021-08-27 | End: 2021-08-28 | Stop reason: HOSPADM

## 2021-08-26 RX ADMIN — SODIUM CHLORIDE, POTASSIUM CHLORIDE, SODIUM LACTATE AND CALCIUM CHLORIDE 125 ML/HR: 600; 310; 30; 20 INJECTION, SOLUTION INTRAVENOUS at 03:07

## 2021-08-26 RX ADMIN — OXYTOCIN 10000 MILLI-UNITS: 10 INJECTION, SOLUTION INTRAMUSCULAR; INTRAVENOUS at 12:05

## 2021-08-26 RX ADMIN — ACETAMINOPHEN 650 MG: 325 TABLET ORAL at 17:48

## 2021-08-26 RX ADMIN — IBUPROFEN 800 MG: 800 TABLET, FILM COATED ORAL at 14:09

## 2021-08-26 RX ADMIN — Medication 10 ML/HR: at 05:50

## 2021-08-26 RX ADMIN — IBUPROFEN 800 MG: 800 TABLET, FILM COATED ORAL at 21:56

## 2021-08-26 NOTE — PROGRESS NOTES
Bedside and Verbal shift change report given to MANISHA Sood RN (oncoming nurse) by ROGERIO Franco (offgoing nurse). Report included the following information SBAR, Kardex, Intake/Output, MAR and Recent Results.

## 2021-08-26 NOTE — PROGRESS NOTES
0700: SBAR report received from [address] [place] [address] Dr: Pt positioned to L lateral with pillow between knees    0820: Pt placed in Trendelenburg. 6723: Dr. Sandra Yap at bedside. SVE 9/100/+1. Pt positioned to L lateral with peanut ball between knees    1022: Dr. Sandra Yap at bedside. SVE ant. Lip. Pt repositoned to L side. 1145: Dr. Sandra aYp at bedside. SVE complete. Orders start pushing with pt.     1151: Start pushing with pt. RN remains in continuous attendance at the bedside. Assessment & evaluation of fetal heart rate ongoing via continuous EFM. 1200: Dr. Sandra Yap at bedside. RN remained at bedside throughout pushing. EFM continuously assessed. Vaginal delivery of viable infant. 1648: TRANSFER - OUT REPORT:    Verbal report given to SHANNON Shah RN(name) on [facility]  being transferred to MIU(unit) for routine progression of care       Report consisted of patients Situation, Background, Assessment and   Recommendations(SBAR). Information from the following report(s) SBAR, Kardex, Procedure Summary, Intake/Output, MAR and Recent Results was reviewed with the receiving nurse. Lines:   Peripheral IV 08/25/21 Left;Posterior Hand (Active)   Site Assessment Clean, dry, & intact 08/26/21 0711   Phlebitis Assessment 0 08/26/21 0711   Infiltration Assessment 0 08/26/21 0711   Dressing Status Clean, dry, & intact 08/26/21 0711   Dressing Type Transparent;Tape 08/26/21 0711   Hub Color/Line Status Pink; Infusing 08/26/21 0711   Action Taken Blood drawn 08/25/21 0148   Alcohol Cap Used Yes 08/25/21 1600        Opportunity for questions and clarification was provided.       Patient transported with:   Registered Nurse

## 2021-08-26 NOTE — PROGRESS NOTES
This is a 32 y/o F [id number] on pitocin augmentation. Cervix has progressed to 80/8,0 station with some molding. FHR is CAT I. Will continue trial of labor.

## 2021-08-26 NOTE — L&D DELIVERY NOTE
Delivery Summary    Patient: Norm Lemos MRN: [id number]  SSN: [id number]    YOB: 1995  Age: 31 y.o. Sex: female       Information for the patient's :  Iman Gutierrez [[id number]]       Labor Events:    Labor: No    Steroids: None   Cervical Ripening Date/Time:       Cervical Ripening Type: None   Antibiotics During Labor: No   Rupture Identifier:      Rupture Date/Time: 2021 3:54 PM   Rupture Type: AROM   Amniotic Fluid Volume:      Amniotic Fluid Description: Clear    Amniotic Fluid Odor: None    Induction: Oxytocin       Induction Date/Time: 2021 11:32 AM    Indications for Induction: Elective    Augmentation: AROM   Augmentation Date/Time: 13:54 PM   Indications for Augmentation: Ineffective Contraction Pattern   Labor complications: None       Additional complications:        Delivery Events:  Indications For Episiotomy:     Episiotomy: None   Perineal Laceration(s): None   Repaired:     Periurethral Laceration Location:      Repaired:     Labial Laceration Location:     Repaired:     Sulcal Laceration Location:     Repaired:     Vaginal Laceration Location:     Repaired:     Cervical Laceration Location:     Repaired:     Repair Suture: None   Number of Repair Packets:     Estimated Blood Loss (ml):  ml   Quantitative Blood Loss (ml)                Delivery Date: 2021    Delivery Time: 12:00 PM  Delivery Type: Vaginal, Spontaneous  Sex:  Female    Gestational Age: 37w11d   Delivery Clinician:  Yevgeniy Hand  Living Status: Living   Delivery Location: L&D Room 204          APGARS  One minute Five minutes Ten minutes   Skin color: 0   1        Heart rate: 2   2        Grimace: 2   2        Muscle tone: 1   2        Breathin   2        Totals: 6   9            Presentation: Vertex    Position: Left Occiput Anterior  Resuscitation Method:  Suctioning-bulb; Tactile Stimulation     Meconium Stained: None      Cord Information: 3 Vessels  Complications: None  Cord around:    Delayed cord clamping? Yes  Cord clamped date/time:2021 12:03 PM  Disposition of Cord Blood: Discard    Blood Gases Sent?: No    Placenta:  Date/Time: 2021 12:04 PM  Removal: Expressed      Appearance: Normal;Intact     [place] Measurements:  Birth Weight: 3.705 kg      Birth Length: 49.5 cm      Head Circumference:        Chest Circumference:       Abdominal Girth: Other Providers:   ROSA JANE;ALLEN THOMAS;SARA GUNDERSON;TEE ALAN;KENDELL HYLTON, Obstetrician;Primary Nurse;Primary [place] Nurse;Nursery Nurse;Charge Nurse           Group B Strep:   Lab Results   Component Value Date/Time    GrBStrep, External Negative 2021 12:00 AM     Information for the patient's :  Esperanza Koroma [[id number]]   No results found for: ABORH, PCTABR, PCTDIG, BILI, ABORHEXT, ABORH     No results for input(s): PCO2CB, PO2CB, HCO3I, SO2I, IBD, PTEMPI, SPECTI, PHICB, ISITE, IDEV, IALLEN in the last 72 hours.

## 2021-08-26 NOTE — PROGRESS NOTES
Patient comfortable, feeling some pressure.     FHR remains category 1  [place] q 2-3 min    Cvx 90%/9cm - ant lip/+1    Continue augmentation with pitocin, anticipate

## 2021-08-26 NOTE — ADT AUTH CERT NOTES
NOTIFICATION OF LABOR AND DELIVERY     ADMITTED 2021  BABY BORN VAGINALLY ON 2021    MOM AND BABY STILL IN HOUSE     PLEASE FAX BACK STATUS, REF # AND CLINICAL REQUESTS- ALSO- IF NO AUTH IS REQUIRED PLEASE FAX BACK A RESPONSE- Mateus Diaz!     [address] [facility] CONTACT   Asad 30 [phone]  [address] [place] [address] [phone]         FACILITY NPI :[id number]  FACILITY TAX ID : [id number]     [address] [place]  [address] [place] [address]  [phone]            Patient Name :Ximena Simons   : 1995 (25 yrs)  MRN : [id number]     Patient Mailing Address [address] [place] [address] [] , [facility]                                                             .         Insurance Plan Payor: Lazara Moran / Plan: [address] [place] [address] / Product Type: PPO /      Primary Coverage Subscriber ID : [id number]        Current Patient Class : INPATIENT  Admit Date : 2021     REQUESTED LEVEL OF CARE: INPATIENT [101]                                                         Diagnosis : Labor and delivery, indication for care                     Labor without complication     YFZ01 Code : Labor without complication [F85]  Labor and delivery, indication for care [O75.9]       Admitting and Attending Info:  Admitting Provider : Sparkle Reyes MD   NPI: [id number]  Admitting Provider Phone. [phone] Provider Address:SAME AS FACILITY        Comments  Comment     Last edited by  on  [place] Name: Leandra Hobbs                               Patient Demographics    Patient Name   Olu Daiz Legal Sex   Female    1995 Address   [address] [facility] [address] Phone   [phone] (Home)   [phone] [facility] Account    Name Parvez ID Class Status Primary Coverage   Olu Diaz [id number] INPATIENT Open [place]          Guarantor Account (for Hospital Account [de-identified])    Name Relation to Pt Service Area Active? Acct Type   Ebb Claribel [facility] Yes Personal/Family   Address Phone     [address]  [phone](T)            Coverage Information (for Hospital Account [de-identified])    F/O Payor/Plan Subscriber  Subscriber Sex Precert #   [place] OF [place] 89 M    Subscriber Subscriber #   Saran Tran [id number]   [facility] # Group Name   [id number]   Address Phone   [address]    Policy Number Status Effective Date Benefits Phone   [id number] -  -   Auth/Cert             Admission Information    Arrival Date/Time: 2021 Admit Date/Time: 20218 IP Adm.  Date/Time: 20218   Admission Type: Emergency Point of Origin: Non-health Care Facility/self Admit Category:    Means of Arrival: Car Primary Service: Obstetrics Secondary Service: N/A   Transfer Source:  Service Area: [facility] Unit: [facility] 2 LABOR & DELIVERY   Admit Provider: Darroll Shone, MD Attending Provider: Carol Germain MD Referring Provider:    Admission Information    Attending Provider Admission Dx Admitted on   Carol Germain MD Labor without complication, Labor and delivery, indication for care 21   Service Isolation Code Status   OBSTETRICS  Not on file   Allergies Advance Care Planning    Amoxicillin, Tree Nut, Oxycodone-acetaminophen Jump to the Activity     Admission Information    Unit/Bed: [facility] 2 LABOR & DELIVERY/ Service: OBSTETRICS   Admitting provider: Darroll Shone, MD Phone: [phone]   Attending provider: Carol Germain MD Phone: [phone]   PCP: Carol Germain MD Phone: [phone]   Admission dx: L & D Patient class: I   Admission type: ER     Patient Demographics    Patient Name   Leslie Aden   [id number] Legal Sex   Female    1995 Address   [address] [address] [place] [address] Phone   [phone] (Home)   [phone] (Mobile)   H&P Notes     H&P by Deisi Nieto CNM at 21 0297 documented on ED to Hosp-Admission (Current) from 2021 in [facility] 2 LABOR & DELIVERY  Author: Deisi Nieto CNM Author Type: Midwife Filed: 21 3740   Note Status: Signed Cosign: Cosigned by Enrique Guerin MD at 21 2409 Date of Service: 21   : Deisi Nieto CNM (Midwife)         JOSE History & Physical     Subjective:      Gabriel Mahoney is a 31 y.o. female  @ 39w5d by Estimated Date of Delivery: 21 who arrives to L&D with chief complaint of uterine contractions for the last several hours that have gotten more intense. She  endorses +FM, intact membranes; Pt denies vaginal bleeding, fever/chills, chest pain, SOB, HA, scotomata, sudden swelling, nor malaise. She reports a benign pregnancy course     Pregnancy problems include:   - Body mass index is 29.1 kg/m². -        Patient Active Problem List   Diagnosis Code    Anxiety F41.9    Abnormal Pap smear of cervix R87.619    Pregnancy Z34.90    Labor without complication B27    Labor and delivery, indication for care O75.9         Allergies  -        Allergies   Allergen Reactions    Amoxicillin Anaphylaxis    Tree Nut Anaphylaxis    Oxycodone-Acetaminophen Other (comments)         Prenatal Labs  GBS negative per pt- records to be retrieved.       OB History           OB History         4    Para   2    Term   2       0    AB   1    Living   2        SAB   1    TAB   0    Ectopic   0    Molar   0    Multiple   0    Live Births   2                   PMHx       Past Medical History:   Diagnosis Date    Abnormal Pap smear of cervix 8/15/2020     HPV +    Anxiety 8/15/2020         PSHx  History reviewed.  No pertinent surgical history.      F/SHx        Family History   Problem Relation Age of Onset    Hypertension Mother      Breast Cancer Maternal Aunt      Diabetes Maternal Grandfather        Social History            Socioeconomic History    Marital status:        Spouse name: Not on file    Number of children: Not on file    Years of education: Not on file    Highest education level: Not on file   Occupational History    Not on file   Tobacco Use    Smoking status: Never Smoker    Smokeless tobacco: Never Used   Vaping Use    Vaping Use: Never used   Substance and Sexual Activity    Alcohol use: Not Currently    Drug use: Never    Sexual activity: Yes       Birth control/protection: Injection       Comment: Depo-provera   Other Topics Concern     Service Not Asked    Blood Transfusions Not Asked    Caffeine Concern Not Asked    Occupational Exposure Not Asked    Hobby Hazards Not Asked    Sleep Concern Not Asked    Stress Concern Not Asked    Weight Concern Not Asked    Special Diet Not Asked    Back Care Not Asked    Exercise Not Asked    Bike Helmet Not Asked   [address] Not Asked    Self-Exams Not Asked   Social History Narrative    Not on file      Social Determinants of Health          Financial Resource Strain:     Difficulty of Paying Living Expenses:    Food Insecurity:     Worried About Running Out of Food in the Last Year:     Ran Out of Food in the Last Year:    Transportation Needs:     Lack of Transportation (Medical):      Lack of Transportation (Non-Medical):    Physical Activity:     Days of Exercise per Week:     Minutes of Exercise per Session:    Stress:     Feeling of Stress :    Social Connections:     Frequency of Communication with Friends and Family:     Frequency of Social Gatherings with Friends and Family:     Attends Mu-ism Services:     Active Member of Clubs or Organizations:     Attends Club or Organization Meetings:     Marital Status:    Intimate Partner Violence:     Fear of Current or Ex-Partner:     Emotionally Abused:     Physically Abused:  Sexually Abused:          Home Medications:  Prior to Admission Medications   Prescriptions Last Dose Informant Patient Reported? Taking? PNV Comb #2-Iron-Omega 3-FA 06-5-864-200 mg cmpk 8/18/2021 at Unknown time   Yes Yes   Sig: Take  by mouth. fluticasone propionate (FLONASE) 50 mcg/actuation nasal spray Not Taking at Unknown time   Yes No   Sig: fluticasone propionate 50 mcg/actuation nasal spray,suspension   Patient not taking: Reported on 8/25/2021   promethazine (PHENERGAN) 25 mg tablet Not Taking at Unknown time   No No   Sig: Take 1 Tab by mouth every six (6) hours as needed for Nausea. Patient not taking: Reported on 8/25/2021      Facility-Administered Medications: None         Review of Systems:  A comprehensive review of systems was negative except for that written in the History of Present Illness.        Objective:             Vitals:     08/25/21 0327 08/25/21 0332 08/25/21 0337 08/25/21 0342   BP:           Pulse:           Resp:           Temp:           SpO2: 97% 100% 100% 99%   Weight:           Height:              Body mass index is 29.1 kg/m². Physical Exam:  General:  Alert, cooperative, no distress, appears stated age. Lungs:   Clear to auscultation bilaterally. Symmetrical expansion, non-labored   Heart:  Regular rate and rhythm   GI/Abdomen:   Soft, non-tender. Gravid. Extremities: Extremities normal, atraumatic, no cyanosis or edema.    Skin: Skin color, texture, turgor normal. No rashes or lesions   /Cervical Exam: no lesions or erythema  Cervix: Cervical Exam  Dilation (cm):  (2-3)  Eff: 50 %  Station: -3  Vaginal exam done by? : ALTON Modi CNM  Membrane Status: Intact  Fetal Presentation: Vertex by Leopolds  Pelvimetry: Adequate  Fetal Size: AGA      Electronic Fetal Monitoring                           Fetal Heart Rate:   Baseline 135, moderate variability, positive accels, no decels, cat 1 FHT      Uterine Contractions: External, q2-4min, relaxed to palpation     Assessment:   31 y.o. female Jadiel 170 @  39w5 d by HUGO of Estimated Date of Delivery: 21  Admission diagnoses:              -latent phase labor                    Pregnancy problems include:   -        Patient Active Problem List   Diagnosis Code    Anxiety F41.9    Abnormal Pap smear of cervix R87.619    Pregnancy Z34.90    Labor without complication I27    Labor and delivery, indication for care O75.9      - Body mass index is 29.1 kg/m².     Pertinent Labs:  - A  -GBS negative      EFM:   - Category 1  - NST: Fetal NST Findings: reactive with indication of establishing a baseline fetal evaluation conducted over a minimum of 20 minutes     Plan:   Admit to L&D for labor     Discussed with patient admission to the unit based on gestational age, parity, and maternal distance from the hospital. Pt was advised if she does not make change in subsequent cervical exams, may need augmentation. R/B/SE of medications used with augmentation- Pitocin- discussed.     Tests/Labs/Monitoring ordered: Electronic Fetal Monitoring Continuous EFM, SVE, CBC w/o DIF and Sample to blood bank (hold)  GBS negative     Review of prenatal records to include labs/diagnostic testing, encounter notes  Review and sign consents   Patient Education:              - Pain management, as indicated; R/B/SE of IV meds, nitrous oxide, and epidural discussed.               - Plan of care        Patient has been counseled regarding this plan of care and is in agreement; all questions answered.     Collaborative MD: Traci Morton        Signed By:  Eugene Nieto CNM      2021 3:48 AM                Patient Demographics    Patient Name   Chanelle Laurent   [id number] Legal Sex   Female    1995 Address   [address] [facility] [address] Phone   [phone] (Home)   [phone] (Mobile)   CSN:   [id number]   [address] [place] [address] Date: Admit Time Room Bed   Aug 25, 2021 12:38  [[id number]] 01 [24296]   Attending Providers    Provider Pager From To   Sergio Gabriel MD  21    Emergency Contact(s)    Name Relation Home Work Mobile   Geno Spouse [phone]           DELIVERY CLINICAL     Dodie Gomez  MRN: [id number]   Link to Baby  Comment     Last edited by  on  at    Baby's name MRN Account  Age Sex Admission Type   Kirk Abel [id number] 21 0 days F Confirmed Admission - L&D    OB History       4    Para   2    Term   2       0    AB   1    Living   2      SAB   1    TAB   0    Ectopic   0    Molar   0    Multiple   0    Live Births   2         # Outcome Date GA Labor/2nd Weight Sex Delivery Anes PTL Lv A1 A5   1 SAB                 2 Term 02/20/15    M Vag-Forceps   Living        3 Term 19    M Vag-Forceps   Living        4 Current                 Prenatal History     Most Recent Value   Did You Receive Prenatal Care Yes   Name Of [facility] Provider Jhonatan 63 By [facility] (Maternal Fetal Medicine)? No   Fetal Ultrasound Abnormalities/Concerns?  No   Infant Feeding Breast Milk and Formula   Circumcision Planned No   Pediatrician After Birth/ Follow Up Baby Visits [facility]   Dating Summary    Working HUGO: 21 set by [facility] on 21 based on Ultrasound on 21   Based On HUGO GA Diff GA User Date   Last Menstrual Period on 20 (Exact Date) 21 Same  [facility] 21   Ultrasound on 21 Working 11w3d [facility] 21   Prenatal Results    Prenatal Labs    Test Value Date Time   ABO/Rh * A Positive  21    Antibody Scrn      Hgb      Hct      Platelets      Rubella * Immune  21    RPR      T. Pallidum Antibody      Urine      Hep B Surf Ag      Hep C      HIV * Non Reactive  21    Gonorrhea      Chlamydia      TSH      GTT, 1 HR (Glucola)      GTT, Fasting      GTT, 1 HR      GTT, 2 HR      GTT, 3 HR      3rd Trimester    Test Value Date Time   Hgb      Hct Platelets      Group B Strep * Negative  21    Antibody Scrn * Negative  21    TSH      T. Pallidum Antibody * Negative  21    Hep B Surf Ag * Non-Reactive  21    Gonorrhea * Negative  21    Chlamydia * Negative  21     Screening/Diagnostics    Test Value Date Time   AFP Only      AFP Tetra      Hgb Electrophoresis      Amniocentesis      Cystic Fibrosis      Thalessemia      Robin-Sachs      Canavan      PAP Smear      Beta HCG      NT      NIPT      COVID-19      Lab    Test Value Date Time   GTT, Fasting      GTT, 1HR      GTT, 2HR      GTT, 3HR      RPR      Beta HCG      CMV Ab      Toxoplasma Ab      Varicella Zoster Ab         Legend    *: Historical  View all results for this pregnancy   Lizette Marcum [[id number]]    [place] Delivery    Birth date/time: 2021 12:00:59  Delivery type: Vaginal, Spontaneous  Labor Events     labor?: No   steroids?: None  Cervical ripening type: None  Antibiotics during labor?: No  Rupture date/time: 2021 1554  Rupture type: AROM  Fluid color: Clear  Fluid odor: None  Induction: Oxytocin  Induction date/time: 2021 1132  Induction indications: Elective  Augmentation: AROM  Augmentation date/time: 2021 1554  Augmentation indications: Ineffective Contraction Pattern  Labor/Delivery complications: None  Delivery Providers    Delivering clinician: Cely Hand MD  Provider Role   Cely Hand MD Obstetrician   Andrew Briones Primary Nurse   Gato Bianchi, RN Primary [place] Nurse   Radhika Hicks, RN Nursery Nurse   Mt Jackson, RN Charge Nurse   Anesthesia    Method: Epidural  Multiple Birth Onset Second Stage    No data filed   Operative Delivery    Forceps attempted?: No  Vacuum extractor attempted?: No  Presentation    Presentation: Vertex  Position: Left Occiput Anterior  Apgars    Living status: Living  Apgars:  1 min. :  5 min.:  10 min. :  15 min.:  20 min. Debbie Montoya Skin color:  0  1       Heart rate:  2  2       Reflex irritability:  2  2       Muscle tone:  1  2       Respiratory effort:  1  2       Total:  6  9       Apgars assigned by: LENORA HARO RN  Resuscitation    Method: Suctioning-bulb, Tactile Stimulation  Shoulder Dystocia    Shoulder dystocia present?: No  Measurements    Weight:   Length:   Lacerations    Episiotomy: None    Lacerations: None  Repair Needed: None  # of Repair Packets:   Suture Type and Size:   Suture Comment:   Estimated Blood Loss (mL):     Placenta    Placenta Date/Time: 8/26/2021 1204  Removal: Expressed  Appearance: Normal, Intact Placenta disposition: Discarded   Vaginal Counts    Initial count personnel: BUCK CISNEROS AND WILLIAM COBURN  Final count personnel: BUCK CISNEROS AND WILLIAM COBURN  Accurate final count?: Yes  Skin to Skin    Skin to skin initiated date/time: 8/26/2021 1202  Skin to skin with: Mother  Delivery Summary History    The Delivery Summary has not been signed.

## 2021-08-27 LAB
COMMENT, HOLDF: NORMAL
HCT VFR BLD AUTO: 33.6 % (ref 35–47)
HGB BLD-MCNC: 10.2 G/DL (ref 11.5–16)
SAMPLES BEING HELD,HOLD: NORMAL

## 2021-08-27 PROCEDURE — 85018 HEMOGLOBIN: CPT

## 2021-08-27 PROCEDURE — 74011250637 HC RX REV CODE- 250/637: Performed by: OBSTETRICS & GYNECOLOGY

## 2021-08-27 PROCEDURE — 65270000029 HC RM PRIVATE

## 2021-08-27 RX ADMIN — ACETAMINOPHEN 650 MG: 325 TABLET ORAL at 02:07

## 2021-08-27 RX ADMIN — IBUPROFEN 800 MG: 800 TABLET, FILM COATED ORAL at 07:28

## 2021-08-27 RX ADMIN — IBUPROFEN 800 MG: 800 TABLET, FILM COATED ORAL at 14:58

## 2021-08-27 RX ADMIN — ACETAMINOPHEN 650 MG: 325 TABLET ORAL at 21:17

## 2021-08-27 RX ADMIN — Medication 1 TABLET: at 08:30

## 2021-08-27 NOTE — LACTATION NOTE
This note was copied from a baby's chart. Mother states baby has latched a few times and has been sleepy at times. Encouraged mother to call out with next feed attempt for feeding assistance. BF discharge info reviewed as parents are hoping for a early discharge. Printed info given. Chart shows numerous feedings, void, stool WDL. Importance of monitoring outputs and feedings on first week Breastfeeding log and follow up with pediatrician visit for weight check in 1-2 days reviewed. Encouraged to call warm line number for any questions/problems that arise. Engorgement Care Guidelines:  Reviewed how milk is made and normal phases of milk production. Taught care of engorged breasts - frequent breastfeeding encouraged, cool packs and motrin as tolerated. Anticipatory guidance shared. Pt will successfully establish breastfeeding by feeding in response to early feeding cues or wake every 3h, will obtain deep latch, and will keep log of feedings/output. Taught to BF at hunger cues and or q 2-3 hrs and to offer 10-20 drops of hand expressed colostrum at any non-feeds. Breast Assessment  Left Breast:  (did not see breasts)  Left Nipple:  Inverted, Large (Bulbuous nipples that invert on pressure)  Right Breast: Small , Medium, Wide angle  Right Nipple: Large, Inverted (bulbuous nipple that inverts on pressure)  Breast- Feeding Assessment  Attends Breast-Feeding Classes: No  Breast-Feeding Experience: Yes (3 weeks, 3 months)  Breast Trauma/Surgery: No  Type/Quality: Good  Lactation Consultant Visits  Breast-Feedings: Fair  Mother/Infant Observation  Mother Observation: Alignment, Recognizes feeding cues  Infant Observation: Rhythmic suck, Feeding cues, Latches nipple and aereolae, Lips flanged, lower, Lips flanged, upper, Opens mouth

## 2021-08-27 NOTE — DISCHARGE SUMMARY
Obstetrical Discharge Summary     Name: Mahamed Garcia MRN: [id number]  SSN: [id number]    YOB: 1995  Age: 31 y.o. Sex: female      Admit Date: 2021    Discharge Date: 2021     Admitting Physician: Sandie Frias MD     Attending Physician:  Steven Barlow MD     Admission Diagnoses: Labor without complication [W97]  Labor and delivery, indication for care [O75.9]    Discharge Diagnoses:   Information for the patient's :  Jenni Telles [[id number]]   Delivery of a 3.705 kg female infant via Vaginal, Spontaneous on 2021 at 12:00 PM  by Joshua Hooper. Apgars were 6  and 9 . Additional Diagnoses:   Hospital Problems  Date Reviewed: 3/10/2021        Codes Class Noted POA    Labor without complication [id number]-[id number]: F89  ICD-9-CM: 542  2021 Yes        Labor and delivery, indication for care ICD-10-CM: O75.9  ICD-9-CM: 659.90  2021 Unknown             Lab Results   Component Value Date/Time    Rubella, External Immune 2021 12:00 AM    GrBStrep, External Negative 2021 12:00 AM       Hospital Course: Normal hospital course following the delivery. Today, no complaints, doing well. Minimal lochia. Visit Vitals  /65 (BP 1 Location: Right upper arm, BP Patient Position: At rest)   Pulse 76   Temp 97.7 °F (36.5 °C)   Resp 16   Ht 5' 1\" (1.549 m)   Wt 69.9 kg (154 lb)   SpO2 98%   Breastfeeding Unknown   BMI 29.10 kg/m²     Physical Exam  Constitutional:       Appearance: Normal appearance. Pulmonary:      Effort: Pulmonary effort is normal.   Abdominal:      Palpations: Abdomen is soft. Neurological:      Mental Status: She is alert and oriented to person, place, and time. Psychiatric:         Mood and Affect: Mood normal.     Gyn:     Fundus firm and below umbilicus. nontender.     Patient Instructions:   Current Discharge Medication List      CONTINUE these medications which have NOT CHANGED    Details   PNV Comb #2-Iron-Omega 3-FA 67-4-112-200 mg cmpk Take  by mouth. fluticasone propionate (FLONASE) 50 mcg/actuation nasal spray fluticasone propionate 50 mcg/actuation nasal spray,suspension         STOP taking these medications       promethazine (PHENERGAN) 25 mg tablet Comments:   Reason for Stopping:               Disposition at Discharge: Home or self care    Condition at Discharge: Stable    Reference my discharge instructions. Follow-up Appointments   Procedures    FOLLOW UP VISIT Appointment in: 6 Weeks     Standing Status:   Standing     Number of Occurrences:   1     Order Specific Question:   Appointment in     Answer:   6 Weeks        Signed By:  Pavan Minor MD     August 27, 2021             Addendum: Will hold discharge today, as infant is jaundice and not cleared to go home. Ruddy Arias

## 2021-08-27 NOTE — PROGRESS NOTES
0145: Patient called out stating she \"passed clots. \" It was patient's first time getting up in several hours. Patient had no clots and had small bleeding on pad. Bleeding has previously been scant. Fundus firm at U-1. Mother knows to call if she has any more concerns.

## 2021-08-28 VITALS
DIASTOLIC BLOOD PRESSURE: 69 MMHG | BODY MASS INDEX: 29.07 KG/M2 | WEIGHT: 154 LBS | HEART RATE: 74 BPM | OXYGEN SATURATION: 94 % | RESPIRATION RATE: 17 BRPM | HEIGHT: 61 IN | SYSTOLIC BLOOD PRESSURE: 115 MMHG | TEMPERATURE: 97.7 F

## 2021-08-28 PROCEDURE — 74011250637 HC RX REV CODE- 250/637: Performed by: OBSTETRICS & GYNECOLOGY

## 2021-08-28 RX ORDER — IBUPROFEN 600 MG/1
600 TABLET ORAL
Qty: 30 TABLET | Refills: 0 | Status: SHIPPED | OUTPATIENT
Start: 2021-08-28

## 2021-08-28 RX ADMIN — IBUPROFEN 800 MG: 800 TABLET, FILM COATED ORAL at 10:35

## 2021-08-28 RX ADMIN — ACETAMINOPHEN 650 MG: 325 TABLET ORAL at 02:27

## 2021-08-28 RX ADMIN — IBUPROFEN 800 MG: 800 TABLET, FILM COATED ORAL at 02:27

## 2021-08-28 RX ADMIN — ACETAMINOPHEN 650 MG: 325 TABLET ORAL at 06:49

## 2021-08-28 RX ADMIN — Medication 1 TABLET: at 08:39

## 2021-08-28 NOTE — PROGRESS NOTES
Post-Partum Day Number 2 Progress Note    Balaji Villalobos     Assessment: Active Problems:    Vaginal delivery (2021), xx, breast feeding, rh+      Doing well, post partum day 2    Plan:   1. Discharge home today  2. Follow up in office in 6 weeks with Monty Hernandez MD   3. Post partum activity advised, diet as tolerated  4. Discharge Medications: ibuprofen,and medications prior to admission    Information for the patient's :  Nelson Loyd [[id number]]   Vaginal, Spontaneous    Patient doing well without significant complaint. Voiding without difficulty, normal lochia. Current Facility-Administered Medications   Medication Dose Route Frequency    prenatal vitamin tablet 1 Tablet  1 Tablet Oral DAILY    ibuprofen (MOTRIN) tablet 800 mg  800 mg Oral Q8H       Vitals:  Visit Vitals  /69   Pulse 74   Temp 97.7 °F (36.5 °C)   Resp 17   Ht 5' 1\" (1.549 m)   Wt 69.9 kg (154 lb)   LMP 2020 (Exact Date)   SpO2 94%   Breastfeeding Unknown   BMI 29.10 kg/m²     Temp (24hrs), Av.9 °F (36.6 °C), Min:97.7 °F (36.5 °C), Max:98.3 °F (36.8 °C)      Exam:         Patient without distress. Abdomen soft, fundus firm, nontender                 Lower extremities are negative for swelling, cords or tenderness. Labs:     Lab Results   Component Value Date/Time    WBC 9.7 2021 01:43 AM    WBC 6.9 2021 04:27 PM    HGB 10.2 (L) 2021 06:01 AM    HGB 10.3 (L) 2021 01:43 AM    HGB 13.8 2021 04:27 PM    HCT 33.6 (L) 2021 06:01 AM    HCT 36.8 2021 01:43 AM    HCT 40.1 2021 04:27 PM    PLATELET 039  01:43 AM    PLATELET 346  04:27 PM       No results found for this or any previous visit (from the past 24 hour(s)).

## 2021-08-28 NOTE — DISCHARGE INSTRUCTIONS
POSTPARTUM DISCHARGE INSTRUCTIONS       Name:  Ginny Johnson  YOB: 1995  Admission Diagnosis:  Labor without complication [D47]  Labor and delivery, indication for care [O75.9]     Discharge Diagnosis:    Problem List as of 8/28/2021 Date Reviewed: 3/10/2021        Codes Class Noted - Resolved    Vaginal delivery ICD-10-CM: O80  ICD-9-CM: 650  8/28/2021 - Present        Pregnancy ICD-10-CM: Z34.90  ICD-9-CM: V22.2  3/10/2021 - Present        Anxiety ICD-10-CM: F41.9  ICD-9-CM: 300.00  8/15/2020 - Present        Abnormal Pap smear of cervix ICD-10-CM: R87.619  ICD-9-CM: 795.00  8/15/2020 - Present        RESOLVED: Labor without complication WEA-75-OR: K12  ICD-9-CM: 230  8/25/2021 - 8/28/2021        RESOLVED: Labor and delivery, indication for care ICD-10-CM: O75.9  ICD-9-CM: 659.90  8/25/2021 - 8/28/2021            Attending Physician:  Irina Carnes MD    Delivery Type:  Vaginal Childbirth: What To Expect At Home    Your Recovery: Your body will slowly heal in the next few weeks. It is easy to get too tired and overwhelmed during the first weeks after your baby is born. Changes in your hormones can shift your mood without warning. You may find it hard to meet the extra demands on your energy and time. Take it easy on yourself. Follow-up care is a key part of your treatment and safety. Be sure to make and go to all appointments, and call your doctor if you are having problems. It's also a good idea to know your test results and keep a list of the medicines you take. How can you care for yourself at home? Vaginal bleeding and cramps  · After delivery, you will have a bloody discharge from the vagina. This will turn pink within a week and then white or yellow after about 10 days. It may last for 2 to 4 weeks or longer, until the uterus has healed. Use pads instead of tampons until you stop bleeding.   · Do not worry if you pass some blood clots, as long as they are smaller than a golf ball. If you have a tear or stitches in your vaginal area, change the pad at least every 4 hours to prevent soreness and infection. · You may have cramps for the first few days after childbirth. These are normal and occur as the uterus shrinks to normal size. Take an over-the-counter pain medicine, such as acetaminophen (Tylenol), ibuprofen (Advil, Motrin), or naproxen (Aleve), for cramps. Read and follow all instructions on the label. Do not take aspirin, because it can cause more bleeding. Do not take acetaminophen (Tylenol) and other acetaminophen containing medications (i.e. Percocet) at the same time. Breast fullness  · Your breasts may overfill (engorge) in the first few days after delivery. To help milk flow and to relieve pain, warm your breasts in the shower or by using warm, moist towels before nursing. · If you are not nursing, do not put warmth on your breasts or touch your breasts. Wear a tight bra or sports bra and use ice until the fullness goes away. This usually takes 2 to 3 days. · Put ice or a cold pack on your breast after nursing to reduce swelling and pain. Put a thin cloth between the ice and your skin. Activity  · Eat a balanced diet. Do not try to lose weight by cutting calories. Keep taking your prenatal vitamins, or take a multivitamin. · Get as much rest as you can. Try to take naps when your baby sleeps during the day. · Get some exercise every day. But do not do any heavy exercise until your doctor says it is okay. · Wait until you are healed (about 4 to 6 weeks) before you have sexual intercourse. Your doctor will tell you when it is okay to have sex. · Talk to your doctor about birth control. You can get pregnant even before your period returns. Also, you can get pregnant while you are breast-feeding. Mental Health  · Many women get the \"baby blues\" during the first few days after childbirth. You may lose sleep, feel irritable, and cry easily.  You may feel happy one minute and sad the next. Hormone changes are one cause of these emotional changes. Also, the demands of a new baby, along with visits from relatives or other family needs, add to a mother's stress. The \"baby blues\" often peak around the fourth day. Then they ease up in less than 2 weeks. · If your moodiness or anxiety lasts for more than 2 weeks, or if you feel like life is not worth living, you may have postpartum depression. This is different for each mother. Some mothers with serious depression may worry intensely about their infant's well-being. Others may feel distant from their child. Some mothers might even feel that they might harm their baby. A mother may have signs of paranoia, wondering if someone is watching her. · With all the changes in your life, you may not know if you are depressed. Pregnancy sometimes causes changes in how you feel that are similar to the symptoms of depression. · Symptoms of depression include:  · Feeling sad or hopeless and losing interest in daily activities. These are the most common symptoms of depression. · Sleeping too much or not enough. · Feeling tired. You may feel as if you have no energy. · Eating too much or too little. · [employer] ([employer]I) offers a Warm line; Chat with the Expert phone sessions; Information and Articles about Pregnancy and Postpartum Mood Disorders; Comprehensive List of Free Support Groups; Knowledgeable local coordinators who will offer support, information, and resources; Guide to Resources on [place]; Calendar of events in the  mood disorders community; Latest News and Research; and [facility] [address] for [employer]. Remember - You are not alone; You are not to blame; With help, you will be well. 0-107-933-PPD(6805). WWW. POSTPARTUM. NET   · Writing or talking about death, such as writing suicide notes or talking about guns, knives, or pills.  Keep the numbers for these national suicide hotlines: 8-330-422-TALK (4-335.844.1743) and 5-089-IVCZMEO (2-424.460.6355). If you or someone you know talks about suicide or feeling hopeless, get help right away. Constipation and Hemorrhoids  · Drink plenty of fluids, enough so that your urine is light yellow or clear like water. If you have kidney, heart, or liver disease and have to limit fluids, talk with your doctor before you increase the amount of fluids you drink. · Eat plenty of fiber each day. Have a bran muffin or bran cereal for breakfast, and try eating a piece of fruit for a mid-afternoon snack. · For painful, itchy hemorrhoids, put ice or a cold pack on the area several times a day for 10 minutes at a time. Follow this by putting a warm compress on the area for another 10 to 20 minutes or by sitting in a shallow, warm bath. When should you call for help? Call 911 anytime you think you may need emergency care. For example, call if:  · You are thinking of hurting yourself, your baby, or anyone else. · You passed out (lost consciousness). · You have symptoms of a blood clot in your lung (called a pulmonary embolism). These may include:    · Sudden chest pain. · Trouble breathing. · Coughing up blood. Call your doctor now or seek immediate medical care if:  · You have severe vaginal bleeding. · You are soaking through a pad each hour for 2 or more hours. · Your vaginal bleeding seems to be getting heavier or is still bright red 4 days after delivery. · You are dizzy or lightheaded, or you feel like you may faint. · You are vomiting or cannot keep fluids down. · You have a fever. · You have new or more belly pain. · You pass tissue (not just blood). · Your vaginal discharge smells bad. · Your belly feels tender or full and hard. · Your breasts are continuously painful or red. · You feel sad, anxious, or hopeless for more than a few days. · You have sudden, severe pain in your belly.   · You have symptoms of a blood clot in your leg (called a deep vein thrombosis),          such as:  · Pain in your calf, back of the knee, thigh, or groin. · Redness and swelling in your leg or groin. · You have symptoms of preeclampsia, such as:  · Sudden swelling of your face, hands, or feet. · New vision problems (such as dimness or blurring). · A severe headache. · Your blood pressure is higher than it should be or rises suddenly. · You have new nausea or vomiting. Watch closely for changes in your health, and be sure to contact your doctor if you have any problems. Additional Information:  Not applicable    These are general instructions for a healthy lifestyle:    No smoking/ No tobacco products/ Avoid exposure to second hand smoke    Surgeon General's Warning:  Quitting smoking now greatly reduces serious risk to your health. Obesity, smoking, and sedentary lifestyle greatly increases your risk for illness    A healthy diet, regular physical exercise & weight monitoring are important for maintaining a healthy lifestyle    Recognize signs and symptoms of STROKE:    F-face looks uneven    A-arms unable to move or move unevenly    S-speech slurred or non-existent    T-time-call 911 as soon as signs and symptoms begin - DO NOT go       back to bed or wait to see if you get better - TIME IS BRAIN. I have had the opportunity to make my options or choices for discharge. I have received and understand these instructions.

## 2021-08-28 NOTE — ROUTINE PROCESS
Bedside and Verbal shift change report given to Wade De Luna RN (oncoming nurse) by Claribel Saini RN (offgoing nurse). Report included the following information SBAR, Procedure Summary, Intake/Output, MAR, Accordion, Recent Results and Med Rec Status.

## 2022-03-18 PROBLEM — Z34.90 PREGNANCY: Status: ACTIVE | Noted: 2021-03-10

## 2022-03-19 PROBLEM — F41.9 ANXIETY: Status: ACTIVE | Noted: 2020-08-15

## 2022-03-19 PROBLEM — R87.619 ABNORMAL PAP SMEAR OF CERVIX: Status: ACTIVE | Noted: 2020-08-15

## 2022-07-15 LAB
ANTIBODY SCREEN, EXTERNAL: NORMAL
CHLAMYDIA, EXTERNAL: NORMAL
HBSAG, EXTERNAL: NORMAL
HIV, EXTERNAL: NORMAL
N. GONORRHEA, EXTERNAL: NORMAL
RUBELLA, EXTERNAL: 9.28
T. PALLIDUM, EXTERNAL: NORMAL
TYPE, ABO & RH, EXTERNAL: NORMAL

## 2022-12-30 LAB — GRBS, EXTERNAL: NEGATIVE

## 2023-01-17 ENCOUNTER — HOSPITAL ENCOUNTER (OUTPATIENT)
Age: 28
Setting detail: OBSERVATION
Discharge: HOME | End: 2023-01-18
Attending: OBSTETRICS & GYNECOLOGY | Admitting: OBSTETRICS & GYNECOLOGY
Payer: COMMERCIAL

## 2023-01-17 PROBLEM — Z3A.38 38 WEEKS GESTATION OF PREGNANCY: Status: ACTIVE | Noted: 2023-01-17

## 2023-01-17 PROBLEM — O47.9 UTERINE CONTRACTIONS DURING PREGNANCY: Status: ACTIVE | Noted: 2023-01-17

## 2023-01-17 PROCEDURE — 99283 EMERGENCY DEPT VISIT LOW MDM: CPT

## 2023-01-17 RX ORDER — OXYTOCIN/RINGER'S LACTATE 30/500 ML
10 PLASTIC BAG, INJECTION (ML) INTRAVENOUS AS NEEDED
Status: DISCONTINUED | OUTPATIENT
Start: 2023-01-17 | End: 2023-01-18 | Stop reason: HOSPADM

## 2023-01-17 RX ORDER — OXYTOCIN/RINGER'S LACTATE 30/500 ML
87.3 PLASTIC BAG, INJECTION (ML) INTRAVENOUS AS NEEDED
Status: DISCONTINUED | OUTPATIENT
Start: 2023-01-17 | End: 2023-01-18 | Stop reason: HOSPADM

## 2023-01-17 RX ORDER — SODIUM CHLORIDE 0.9 % (FLUSH) 0.9 %
5-40 SYRINGE (ML) INJECTION AS NEEDED
Status: DISCONTINUED | OUTPATIENT
Start: 2023-01-17 | End: 2023-01-18 | Stop reason: HOSPADM

## 2023-01-17 RX ORDER — TERBUTALINE SULFATE 1 MG/ML
0.25 INJECTION SUBCUTANEOUS AS NEEDED
Status: DISCONTINUED | OUTPATIENT
Start: 2023-01-17 | End: 2023-01-18 | Stop reason: HOSPADM

## 2023-01-17 RX ORDER — NALBUPHINE HYDROCHLORIDE 10 MG/ML
10 INJECTION, SOLUTION INTRAMUSCULAR; INTRAVENOUS; SUBCUTANEOUS ONCE
Status: COMPLETED | OUTPATIENT
Start: 2023-01-18 | End: 2023-01-18

## 2023-01-17 RX ORDER — SODIUM CHLORIDE 0.9 % (FLUSH) 0.9 %
5-40 SYRINGE (ML) INJECTION EVERY 8 HOURS
Status: DISCONTINUED | OUTPATIENT
Start: 2023-01-18 | End: 2023-01-18 | Stop reason: HOSPADM

## 2023-01-17 RX ORDER — SODIUM CHLORIDE, SODIUM LACTATE, POTASSIUM CHLORIDE, CALCIUM CHLORIDE 600; 310; 30; 20 MG/100ML; MG/100ML; MG/100ML; MG/100ML
125 INJECTION, SOLUTION INTRAVENOUS CONTINUOUS
Status: DISCONTINUED | OUTPATIENT
Start: 2023-01-18 | End: 2023-01-18 | Stop reason: HOSPADM

## 2023-01-18 VITALS
HEART RATE: 105 BPM | RESPIRATION RATE: 16 BRPM | TEMPERATURE: 98.1 F | BODY MASS INDEX: 28.32 KG/M2 | SYSTOLIC BLOOD PRESSURE: 103 MMHG | HEIGHT: 61 IN | WEIGHT: 150 LBS | DIASTOLIC BLOOD PRESSURE: 60 MMHG

## 2023-01-18 LAB
ERYTHROCYTE [DISTWIDTH] IN BLOOD BY AUTOMATED COUNT: 13.7 % (ref 11.5–14.5)
HCT VFR BLD AUTO: 37.7 % (ref 35–47)
HGB BLD-MCNC: 11.9 G/DL (ref 11.5–16)
MCH RBC QN AUTO: 25.9 PG (ref 26–34)
MCHC RBC AUTO-ENTMCNC: 31.6 G/DL (ref 30–36.5)
MCV RBC AUTO: 82 FL (ref 80–99)
NRBC # BLD: 0 K/UL (ref 0–0.01)
NRBC BLD-RTO: 0 PER 100 WBC
PLATELET # BLD AUTO: 245 K/UL (ref 150–400)
PMV BLD AUTO: 8.8 FL (ref 8.9–12.9)
RBC # BLD AUTO: 4.6 M/UL (ref 3.8–5.2)
WBC # BLD AUTO: 7.6 K/UL (ref 3.6–11)

## 2023-01-18 PROCEDURE — 85027 COMPLETE CBC AUTOMATED: CPT

## 2023-01-18 PROCEDURE — 36415 COLL VENOUS BLD VENIPUNCTURE: CPT

## 2023-01-18 PROCEDURE — 74011000250 HC RX REV CODE- 250: Performed by: NURSE PRACTITIONER

## 2023-01-18 PROCEDURE — 74011250636 HC RX REV CODE- 250/636: Performed by: NURSE PRACTITIONER

## 2023-01-18 PROCEDURE — 74011000258 HC RX REV CODE- 258: Performed by: NURSE PRACTITIONER

## 2023-01-18 PROCEDURE — G0378 HOSPITAL OBSERVATION PER HR: HCPCS

## 2023-01-18 PROCEDURE — 96375 TX/PRO/DX INJ NEW DRUG ADDON: CPT

## 2023-01-18 PROCEDURE — 96374 THER/PROPH/DIAG INJ IV PUSH: CPT

## 2023-01-18 RX ADMIN — PROMETHAZINE HYDROCHLORIDE 12.5 MG: 25 INJECTION INTRAMUSCULAR; INTRAVENOUS at 00:25

## 2023-01-18 RX ADMIN — SODIUM CHLORIDE, PRESERVATIVE FREE 10 ML: 5 INJECTION INTRAVENOUS at 00:00

## 2023-01-18 RX ADMIN — SODIUM CHLORIDE, POTASSIUM CHLORIDE, SODIUM LACTATE AND CALCIUM CHLORIDE 125 ML/HR: 600; 310; 30; 20 INJECTION, SOLUTION INTRAVENOUS at 00:26

## 2023-01-18 RX ADMIN — NALBUPHINE HYDROCHLORIDE 10 MG: 10 INJECTION, SOLUTION INTRAMUSCULAR; INTRAVENOUS; SUBCUTANEOUS at 00:26

## 2023-01-18 NOTE — H&P
H&P  Admit for observation from [facility]. 26yo [id number] HUGO 23, 38w4d. C/o contractions since 1430 today. They have increased to every 5minutes and are irregular in pain. 3cm/40/-3 in office on . Denies bleeding, leaking of fluid, decreased FM, headache. Routine prenatal care with Dr. Lyndon Burgos. ANTHONY pos/ GBS negative.  x 3, no complications. SGA- 30w 26%. Contractions         Past Medical History        Past Medical History:   Diagnosis Date    Abnormal Pap smear of cervix 8/15/2020     HPV +    Anxiety 8/15/2020            Past Surgical History   No past surgical history on file.                Family History:   Problem Relation Age of Onset    Hypertension Mother      Breast Cancer Maternal Aunt      Diabetes Maternal Grandfather           Social History            Socioeconomic History    Marital status:        Spouse name: Not on file    Number of children: Not on file    Years of education: Not on file    Highest education level: Not on file   Occupational History    Not on file   Tobacco Use    Smoking status: Never    Smokeless tobacco: Never   Vaping Use    Vaping Use: Never used   Substance and Sexual Activity    Alcohol use: Not Currently    Drug use: Never    Sexual activity: Yes       Birth control/protection: Injection       Comment: Depo-provera   Other Topics Concern     Service Not Asked    Blood Transfusions Not Asked    Caffeine Concern Not Asked    Occupational Exposure Not Asked    Hobby Hazards Not Asked    Sleep Concern Not Asked    Stress Concern Not Asked    Weight Concern Not Asked    Special Diet Not Asked    Back Care Not Asked    Exercise Not Asked    Bike Helmet Not Asked    Seat Belt Not Asked    Self-Exams Not Asked   Social History Narrative    Not on file      Social Determinants of Health      Financial Resource Strain: Not on file   Food Insecurity: Not on file   Transportation Needs: Not on file   Physical Activity: Not on file   Stress: Not on file   Social Connections: Not on file   Intimate Partner Violence: Not on file   Housing Stability: Not on file            ALLERGIES: Amoxicillin, Tree nut, and Oxycodone-acetaminophen     Review of Systems   Gastrointestinal:  Positive for abdominal pain. All other systems reviewed and are negative. Vitals:     23 2157   BP: 120/84   Pulse: 72   Resp: 16   Temp: 97.5 °F (36.4 °C)   Weight: 68 kg (150 lb)   Height: 5' 1\" (1.549 m)             Physical Exam  Vitals and nursing note reviewed. Constitutional:       Appearance: Normal appearance. Cardiovascular:      Rate and Rhythm: Normal rate. Pulses: Normal pulses. Pulmonary:      Effort: Pulmonary effort is normal.   Abdominal:      Palpations: Abdomen is soft. Genitourinary:     General: Normal vulva. Comments:  s=d  NST: FHR 140s/ moderate variability, + accels, no decels. +FM  Ctx: q3-5min, lasting 40-80sec, mild to moderate palpation. SVE 3/40/-3, recheck in 2 hours: 3/40/-3, soft, posterior  Musculoskeletal:         General: Normal range of motion. Neurological:      Mental Status: She is alert. Medical Decision Making        Assessment:  28yo  [id number]  38w4d  R/o labor     Plan:  NST reactive, Cat 1  No change in cervix from office, ambulate, rest, recheck cervix in 1.5-2hrs prn. No change in cervix after 2 hours, pt continues contractions q 2-4 minutes and states they are stronger in pain. Offered d/c home or therapeutic rest overnight.    Pt lives over 1 hr away and request overnight obs with therapeutic rest.         Dori Cruz CNM

## 2023-01-18 NOTE — ED PROVIDER NOTES
28yo [id number] HUGO 23, 38w4d. C/o contractions since 1430 today. They have increased to every 5minutes and are irregular in pain. 3cm/40/-3 in office on . Denies bleeding, leaking of fluid, decreased FM, headache. Routine prenatal care with Dr. Jose Ya. A pos/ GBS negative.  x 3, no complications. SGA- 30w 26%. Contractions        Past Medical History:   Diagnosis Date    Abnormal Pap smear of cervix 8/15/2020    HPV +    Anxiety 8/15/2020       No past surgical history on file.       Family History:   Problem Relation Age of Onset    Hypertension Mother     Breast Cancer Maternal Aunt     Diabetes Maternal Grandfather        Social History     Socioeconomic History    Marital status:      Spouse name: Not on file    Number of children: Not on file    Years of education: Not on file    Highest education level: Not on file   Occupational History    Not on file   Tobacco Use    Smoking status: Never    Smokeless tobacco: Never   Vaping Use    Vaping Use: Never used   Substance and Sexual Activity    Alcohol use: Not Currently    Drug use: Never    Sexual activity: Yes     Birth control/protection: Injection     Comment: Depo-provera   Other Topics Concern     Service Not Asked    Blood Transfusions Not Asked    Caffeine Concern Not Asked    Occupational Exposure Not Asked    Hobby Hazards Not Asked    Sleep Concern Not Asked    Stress Concern Not Asked    Weight Concern Not Asked    Special Diet Not Asked    Back Care Not Asked    Exercise Not Asked    Bike Helmet Not Asked    Seat Belt Not Asked    Self-Exams Not Asked   Social History Narrative    Not on file     Social Determinants of Health     Financial Resource Strain: Not on file   Food Insecurity: Not on file   Transportation Needs: Not on file   Physical Activity: Not on file   Stress: Not on file   Social Connections: Not on file   Intimate Partner Violence: Not on file   Housing Stability: Not on file         ALLERGIES: Amoxicillin, Tree nut, and Oxycodone-acetaminophen    Review of Systems   Gastrointestinal:  Positive for abdominal pain. All other systems reviewed and are negative. Vitals:    23 2157   BP: 120/84   Pulse: 72   Resp: 16   Temp: 97.5 °F (36.4 °C)   Weight: 68 kg (150 lb)   Height: 5' 1\" (1.549 m)            Physical Exam  Vitals and nursing note reviewed. Constitutional:       Appearance: Normal appearance. Cardiovascular:      Rate and Rhythm: Normal rate. Pulses: Normal pulses. Pulmonary:      Effort: Pulmonary effort is normal.   Abdominal:      Palpations: Abdomen is soft. Genitourinary:     General: Normal vulva. Comments:  s=d  NST: FHR 140s/ moderate variability, + accels, no decels. +FM  Ctx: q3-5min, lasting 40-80sec, mild to moderate palpation. SVE 3/40/-3, recheck in 2 hours: 3/40/-3, soft, posterior  Musculoskeletal:         General: Normal range of motion. Neurological:      Mental Status: She is alert. Medical Decision Making         Assessment:  28yo  [id number]  38w4d  R/o labor    Plan:  NST reactive, Cat 1  No change in cervix from office, ambulate, rest, recheck cervix in 1.5-2hrs prn. No change in cervix after 2 hours, pt continues contractions q 2-4 minutes and states they are stronger in pain. Offered d/c home or therapeutic rest overnight.    Pt lives over 1 hr away and request overnight obs with therapeutic rest.       Teresita Lofton CNM

## 2023-01-18 NOTE — PROGRESS NOTES
2358: TRANSFER - IN REPORT:    Verbal report received from Jessica Overton RN (name) on Hernan Levin  [facility] for routine progression of care      Report consisted of patients Situation, Background, Assessment and   Recommendations(SBAR). Information from the following report(s) SBAR, Kardex, ED Summary, Intake/Output, MAR, and Recent Results was reviewed with the receiving nurse. Opportunity for questions and clarification was provided. Assessment completed upon patients arrival to unit and care assumed. 0000: Assessment complete. Patient positive for fetal movement and moderate intensity contractions. Negative for vaginal bleeding or discharge, visual disturbances, RUQ pain, or headaches at this time. VSS. Patient oriented to room and call bell within reach. 0025: Phenergan given after reactive strip. (See MAR). 9747: Nubain given after reactive strip. (See MAR). 0100: Patient resting comfortably at this time. 0300: Patient resting comfortably at this time. 0500: Patient resting comfortably. Denying contractions at this time. 6486: Bedside and Verbal shift change report given to Jose Guadalupe Galloway RN (oncoming nurse) by Sulaiman Hagen RN (offgoing nurse). Report included the following information SBAR, Kardex, Intake/Output, MAR, and Recent Results.

## 2023-01-18 NOTE — PROGRESS NOTES
0730: Bedside and Verbal shift change report given to XU Shi RN (oncoming nurse) by ALTON Rodríguez RN (offgoing nurse). Report included the following information SBAR, Intake/Output, and MAR.     0835: Dr. Alejandrina Salamanca at bedside, SVE unchanged. Plan to d/s patient after a reactive NST.   0900: Patient discharged home.

## 2023-01-18 NOTE — PROGRESS NOTES
0530 SBAR report received from [address] [place] [address]    0730 SBAR report to ANTHONY Sosa

## 2023-01-18 NOTE — PROGRESS NOTES
S: Doing well. Rare irregular contractions noted. Great FM. No LOF, VB, VD, dysuria. Denies any pain. O:   Visit Vitals  /60   Pulse (!) 105   Temp 98.1 °F (36.7 °C)   Resp 16   Ht 5' 1\" (1.549 m)   Wt 68 kg (150 lb)   Breastfeeding No   BMI 28.34 kg/m²     Gen: alert and oriented X3   Resp:nonlabored respirations  Cardiac: normal peripheral perfusion  Abdomen: soft, nontender, nondistended. Gravid  Ext: no pitting edema  SVE 3/40/-3, firm cervix    Patient Vitals for the past 4 hrs: Mode Fetal Heart Rate Fetal Activity Variability Decelerations Accelerations RN Reviewed Strip?   01/18/23 0608 External 140 Present 6-25 BPM None Yes Yes   01/18/23 0530 External 145 Present 6-25 BPM None Yes Yes   01/18/23 0500 External 120 Present 6-25 BPM None Yes Yes     NST 8am 135bpm, mod variability, +accels no decels  [place]- irregular contrations        A/P: multip in latent labor at 38w5d    NST reactive, contractions spaced, cervix unchanged. Discharge home with strict labor and FM precautions. Follow up in office in 2 days as scheduled.       Deonna Proctor MD

## 2023-01-30 ENCOUNTER — HOSPITAL ENCOUNTER (INPATIENT)
Age: 28
LOS: 2 days | Discharge: HOME | End: 2023-02-01
Attending: OBSTETRICS & GYNECOLOGY | Admitting: OBSTETRICS & GYNECOLOGY
Payer: COMMERCIAL

## 2023-01-30 ENCOUNTER — ANESTHESIA (OUTPATIENT)
Dept: LABOR AND DELIVERY | Age: 28
End: 2023-01-30
Payer: COMMERCIAL

## 2023-01-30 ENCOUNTER — ANESTHESIA EVENT (OUTPATIENT)
Dept: LABOR AND DELIVERY | Age: 28
End: 2023-01-30
Payer: COMMERCIAL

## 2023-01-30 PROBLEM — Z3A.40 40 WEEKS GESTATION OF PREGNANCY: Status: ACTIVE | Noted: 2023-01-30

## 2023-01-30 LAB
ABO + RH BLD: NORMAL
BLOOD GROUP ANTIBODIES SERPL: NORMAL
ERYTHROCYTE [DISTWIDTH] IN BLOOD BY AUTOMATED COUNT: 13.9 % (ref 11.5–14.5)
HCT VFR BLD AUTO: 32.9 % (ref 35–47)
HGB BLD-MCNC: 10.4 G/DL (ref 11.5–16)
MCH RBC QN AUTO: 25.4 PG (ref 26–34)
MCHC RBC AUTO-ENTMCNC: 31.6 G/DL (ref 30–36.5)
MCV RBC AUTO: 80.2 FL (ref 80–99)
NRBC # BLD: 0 K/UL (ref 0–0.01)
NRBC BLD-RTO: 0 PER 100 WBC
PLATELET # BLD AUTO: 257 K/UL (ref 150–400)
PMV BLD AUTO: 9 FL (ref 8.9–12.9)
RBC # BLD AUTO: 4.1 M/UL (ref 3.8–5.2)
SPECIMEN EXP DATE BLD: NORMAL
WBC # BLD AUTO: 6.1 K/UL (ref 3.6–11)

## 2023-01-30 PROCEDURE — 74011000250 HC RX REV CODE- 250: Performed by: STUDENT IN AN ORGANIZED HEALTH CARE EDUCATION/TRAINING PROGRAM

## 2023-01-30 PROCEDURE — 65410000002 HC RM PRIVATE OB

## 2023-01-30 PROCEDURE — 74011250636 HC RX REV CODE- 250/636: Performed by: OBSTETRICS & GYNECOLOGY

## 2023-01-30 PROCEDURE — 86900 BLOOD TYPING SEROLOGIC ABO: CPT

## 2023-01-30 PROCEDURE — 75410000000 HC DELIVERY VAGINAL/SINGLE

## 2023-01-30 PROCEDURE — 75410000002 HC LABOR FEE PER 1 HR

## 2023-01-30 PROCEDURE — 36415 COLL VENOUS BLD VENIPUNCTURE: CPT

## 2023-01-30 PROCEDURE — 77030014125 HC TY EPDRL BBMI -B: Performed by: STUDENT IN AN ORGANIZED HEALTH CARE EDUCATION/TRAINING PROGRAM

## 2023-01-30 PROCEDURE — 74011250637 HC RX REV CODE- 250/637: Performed by: OBSTETRICS & GYNECOLOGY

## 2023-01-30 PROCEDURE — 76060000078 HC EPIDURAL ANESTHESIA

## 2023-01-30 PROCEDURE — 85027 COMPLETE CBC AUTOMATED: CPT

## 2023-01-30 PROCEDURE — 74011000250 HC RX REV CODE- 250: Performed by: OBSTETRICS & GYNECOLOGY

## 2023-01-30 PROCEDURE — 74011250636 HC RX REV CODE- 250/636: Performed by: STUDENT IN AN ORGANIZED HEALTH CARE EDUCATION/TRAINING PROGRAM

## 2023-01-30 PROCEDURE — 75410000003 HC RECOV DEL/VAG/CSECN EA 0.5 HR

## 2023-01-30 PROCEDURE — 4A1HXCZ MONITORING OF PRODUCTS OF CONCEPTION, CARDIAC RATE, EXTERNAL APPROACH: ICD-10-PCS | Performed by: OBSTETRICS & GYNECOLOGY

## 2023-01-30 RX ORDER — OXYTOCIN/RINGER'S LACTATE 30/500 ML
87.3 PLASTIC BAG, INJECTION (ML) INTRAVENOUS AS NEEDED
Status: DISCONTINUED | OUTPATIENT
Start: 2023-01-30 | End: 2023-02-01 | Stop reason: HOSPADM

## 2023-01-30 RX ORDER — HYDROCORTISONE 1 %
CREAM (GRAM) TOPICAL AS NEEDED
Status: DISCONTINUED | OUTPATIENT
Start: 2023-01-30 | End: 2023-02-01 | Stop reason: HOSPADM

## 2023-01-30 RX ORDER — OXYTOCIN/RINGER'S LACTATE 30/500 ML
0-20 PLASTIC BAG, INJECTION (ML) INTRAVENOUS
Status: DISCONTINUED | OUTPATIENT
Start: 2023-01-30 | End: 2023-01-30 | Stop reason: HOSPADM

## 2023-01-30 RX ORDER — LANOLIN ALCOHOL/MO/W.PET/CERES
3 CREAM (GRAM) TOPICAL
Status: DISCONTINUED | OUTPATIENT
Start: 2023-01-30 | End: 2023-02-01 | Stop reason: HOSPADM

## 2023-01-30 RX ORDER — OXYTOCIN/RINGER'S LACTATE 30/500 ML
10 PLASTIC BAG, INJECTION (ML) INTRAVENOUS AS NEEDED
Status: DISCONTINUED | OUTPATIENT
Start: 2023-01-30 | End: 2023-02-01 | Stop reason: HOSPADM

## 2023-01-30 RX ORDER — CALCIUM CARBONATE 200(500)MG
200 TABLET,CHEWABLE ORAL
Status: DISCONTINUED | OUTPATIENT
Start: 2023-01-30 | End: 2023-02-01 | Stop reason: HOSPADM

## 2023-01-30 RX ORDER — ONDANSETRON 2 MG/ML
4 INJECTION INTRAMUSCULAR; INTRAVENOUS
Status: DISCONTINUED | OUTPATIENT
Start: 2023-01-30 | End: 2023-01-30 | Stop reason: HOSPADM

## 2023-01-30 RX ORDER — DOCUSATE SODIUM 100 MG/1
100 CAPSULE, LIQUID FILLED ORAL
Status: DISCONTINUED | OUTPATIENT
Start: 2023-01-30 | End: 2023-02-01 | Stop reason: HOSPADM

## 2023-01-30 RX ORDER — LIDOCAINE HYDROCHLORIDE AND EPINEPHRINE 15; 5 MG/ML; UG/ML
INJECTION, SOLUTION EPIDURAL
Status: COMPLETED | OUTPATIENT
Start: 2023-01-30 | End: 2023-01-30

## 2023-01-30 RX ORDER — DIPHENHYDRAMINE HCL 25 MG
25 CAPSULE ORAL
Status: DISCONTINUED | OUTPATIENT
Start: 2023-01-30 | End: 2023-02-01 | Stop reason: HOSPADM

## 2023-01-30 RX ORDER — SODIUM CHLORIDE 0.9 % (FLUSH) 0.9 %
5-40 SYRINGE (ML) INJECTION AS NEEDED
Status: DISCONTINUED | OUTPATIENT
Start: 2023-01-30 | End: 2023-01-30 | Stop reason: HOSPADM

## 2023-01-30 RX ORDER — IBUPROFEN 400 MG/1
800 TABLET ORAL EVERY 8 HOURS
Status: DISCONTINUED | OUTPATIENT
Start: 2023-01-30 | End: 2023-02-01 | Stop reason: HOSPADM

## 2023-01-30 RX ORDER — NORETHINDRONE AND ETHINYL ESTRADIOL 0.5-0.035
25 KIT ORAL ONCE
Status: DISCONTINUED | OUTPATIENT
Start: 2023-01-30 | End: 2023-01-30 | Stop reason: HOSPADM

## 2023-01-30 RX ORDER — NALOXONE HYDROCHLORIDE 0.4 MG/ML
0.4 INJECTION, SOLUTION INTRAMUSCULAR; INTRAVENOUS; SUBCUTANEOUS AS NEEDED
Status: DISCONTINUED | OUTPATIENT
Start: 2023-01-30 | End: 2023-01-30 | Stop reason: HOSPADM

## 2023-01-30 RX ORDER — FENTANYL/BUPIVACAINE/NS/PF 2-1250MCG
1-16 PREFILLED PUMP RESERVOIR EPIDURAL CONTINUOUS
Status: DISCONTINUED | OUTPATIENT
Start: 2023-01-30 | End: 2023-01-30 | Stop reason: HOSPADM

## 2023-01-30 RX ORDER — OXYTOCIN/RINGER'S LACTATE 30/500 ML
87.3 PLASTIC BAG, INJECTION (ML) INTRAVENOUS AS NEEDED
Status: COMPLETED | OUTPATIENT
Start: 2023-01-30 | End: 2023-01-30

## 2023-01-30 RX ORDER — NALBUPHINE HYDROCHLORIDE 10 MG/ML
2.5 INJECTION, SOLUTION INTRAMUSCULAR; INTRAVENOUS; SUBCUTANEOUS
Status: ACTIVE | OUTPATIENT
Start: 2023-01-30 | End: 2023-01-30

## 2023-01-30 RX ORDER — SODIUM CHLORIDE 0.9 % (FLUSH) 0.9 %
5-40 SYRINGE (ML) INJECTION EVERY 8 HOURS
Status: DISCONTINUED | OUTPATIENT
Start: 2023-01-30 | End: 2023-01-30 | Stop reason: HOSPADM

## 2023-01-30 RX ORDER — SODIUM CHLORIDE, SODIUM LACTATE, POTASSIUM CHLORIDE, CALCIUM CHLORIDE 600; 310; 30; 20 MG/100ML; MG/100ML; MG/100ML; MG/100ML
125 INJECTION, SOLUTION INTRAVENOUS CONTINUOUS
Status: DISCONTINUED | OUTPATIENT
Start: 2023-01-30 | End: 2023-02-01 | Stop reason: HOSPADM

## 2023-01-30 RX ORDER — SIMETHICONE 80 MG
80 TABLET,CHEWABLE ORAL
Status: DISCONTINUED | OUTPATIENT
Start: 2023-01-30 | End: 2023-02-01 | Stop reason: HOSPADM

## 2023-01-30 RX ORDER — IBUPROFEN 400 MG/1
800 TABLET ORAL EVERY 8 HOURS
Status: DISCONTINUED | OUTPATIENT
Start: 2023-01-30 | End: 2023-01-30

## 2023-01-30 RX ORDER — OXYTOCIN/RINGER'S LACTATE 30/500 ML
10 PLASTIC BAG, INJECTION (ML) INTRAVENOUS AS NEEDED
Status: COMPLETED | OUTPATIENT
Start: 2023-01-30 | End: 2023-01-30

## 2023-01-30 RX ORDER — ONDANSETRON 4 MG/1
4 TABLET, ORALLY DISINTEGRATING ORAL
Status: DISCONTINUED | OUTPATIENT
Start: 2023-01-30 | End: 2023-02-01 | Stop reason: HOSPADM

## 2023-01-30 RX ORDER — SODIUM CHLORIDE 0.9 % (FLUSH) 0.9 %
5-40 SYRINGE (ML) INJECTION EVERY 8 HOURS
Status: DISCONTINUED | OUTPATIENT
Start: 2023-01-30 | End: 2023-02-01 | Stop reason: HOSPADM

## 2023-01-30 RX ORDER — SODIUM CHLORIDE, SODIUM LACTATE, POTASSIUM CHLORIDE, CALCIUM CHLORIDE 600; 310; 30; 20 MG/100ML; MG/100ML; MG/100ML; MG/100ML
125 INJECTION, SOLUTION INTRAVENOUS CONTINUOUS
Status: DISCONTINUED | OUTPATIENT
Start: 2023-01-30 | End: 2023-01-30 | Stop reason: HOSPADM

## 2023-01-30 RX ORDER — NALBUPHINE HYDROCHLORIDE 10 MG/ML
10 INJECTION, SOLUTION INTRAMUSCULAR; INTRAVENOUS; SUBCUTANEOUS
Status: DISCONTINUED | OUTPATIENT
Start: 2023-01-30 | End: 2023-01-30 | Stop reason: HOSPADM

## 2023-01-30 RX ORDER — DIPHENHYDRAMINE HYDROCHLORIDE 50 MG/ML
12.5 INJECTION, SOLUTION INTRAMUSCULAR; INTRAVENOUS ONCE
Status: COMPLETED | OUTPATIENT
Start: 2023-01-30 | End: 2023-01-30

## 2023-01-30 RX ORDER — ACETAMINOPHEN 325 MG/1
650 TABLET ORAL
Status: DISCONTINUED | OUTPATIENT
Start: 2023-01-30 | End: 2023-02-01 | Stop reason: HOSPADM

## 2023-01-30 RX ORDER — TERBUTALINE SULFATE 1 MG/ML
0.25 INJECTION SUBCUTANEOUS AS NEEDED
Status: DISCONTINUED | OUTPATIENT
Start: 2023-01-30 | End: 2023-01-30 | Stop reason: HOSPADM

## 2023-01-30 RX ORDER — AMMONIA 15 % (W/V)
1 AMPUL (EA) INHALATION AS NEEDED
Status: DISCONTINUED | OUTPATIENT
Start: 2023-01-30 | End: 2023-02-01 | Stop reason: HOSPADM

## 2023-01-30 RX ORDER — SODIUM CHLORIDE 0.9 % (FLUSH) 0.9 %
5-40 SYRINGE (ML) INJECTION AS NEEDED
Status: DISCONTINUED | OUTPATIENT
Start: 2023-01-30 | End: 2023-02-01 | Stop reason: HOSPADM

## 2023-01-30 RX ADMIN — SODIUM CHLORIDE, POTASSIUM CHLORIDE, SODIUM LACTATE AND CALCIUM CHLORIDE 1000 ML: 600; 310; 30; 20 INJECTION, SOLUTION INTRAVENOUS at 10:10

## 2023-01-30 RX ADMIN — CALCIUM CARBONATE (ANTACID) CHEW TAB 500 MG 200 MG: 500 CHEW TAB at 17:08

## 2023-01-30 RX ADMIN — OXYTOCIN 1 MILLI-UNITS/MIN: 10 INJECTION, SOLUTION INTRAMUSCULAR; INTRAVENOUS at 06:59

## 2023-01-30 RX ADMIN — Medication 10 ML/HR: at 10:46

## 2023-01-30 RX ADMIN — SODIUM CHLORIDE, POTASSIUM CHLORIDE, SODIUM LACTATE AND CALCIUM CHLORIDE 125 ML/HR: 600; 310; 30; 20 INJECTION, SOLUTION INTRAVENOUS at 06:15

## 2023-01-30 RX ADMIN — DIPHENHYDRAMINE HYDROCHLORIDE 12.5 MG: 50 INJECTION, SOLUTION INTRAMUSCULAR; INTRAVENOUS at 17:09

## 2023-01-30 RX ADMIN — OXYTOCIN 87.3 MILLI-UNITS/MIN: 10 INJECTION, SOLUTION INTRAMUSCULAR; INTRAVENOUS at 18:08

## 2023-01-30 RX ADMIN — LIDOCAINE HYDROCHLORIDE,EPINEPHRINE BITARTRATE 4.5 ML: 15; .005 INJECTION, SOLUTION EPIDURAL; INFILTRATION; INTRACAUDAL; PERINEURAL at 10:40

## 2023-01-30 RX ADMIN — OXYTOCIN 10000 MILLI-UNITS: 10 INJECTION, SOLUTION INTRAMUSCULAR; INTRAVENOUS at 17:57

## 2023-01-30 RX ADMIN — SODIUM CHLORIDE, PRESERVATIVE FREE 10 ML: 5 INJECTION INTRAVENOUS at 22:00

## 2023-01-30 RX ADMIN — IBUPROFEN 800 MG: 400 TABLET, FILM COATED ORAL at 21:34

## 2023-01-30 NOTE — PROGRESS NOTES
In room to see patient. Comfortable with epidural.      Visit Vitals  BP (!) 108/58   Pulse 95   Temp 97.7 °F (36.5 °C)   Resp 16   Ht 5' 1\" (1.549 m)   Wt 68.9 kg (152 lb)   SpO2 99%   Breastfeeding No   BMI 28.72 kg/m²     SVE: 60/-2, AROM clear fluid  EFM: Cat 1  [place]: q2 mins    A/P: Pt is a 27 yo  for elective IOL  - CTX q2 mins -- will cut pitocin to 50% and retitrate.  - Recheck PRN.   - Anticipate

## 2023-01-30 NOTE — H&P
[facility] [address], [address]  Phone: [phone], Fax: [phone]  Date: 2023    Pregnancy Problems:  Small for gestational age fetus - 22wks 12% 26wks 16% 30w_26%, no further testing unless lagging [facility]  Urinary tract infectious disease - e.coli on , RADHA neg  Multiparous - BOY, Left CP cyst noted at 15 wks -> resoved 20w    23 6am iol / smh ld/ vaclavik  GBS Negative  PNR faxed to L&D [facility] 1/3/23  History of Present Illness:  Pt is a 27 yo  at 40+3 presenting for elective IOL. Pregnancy has. been uncomplicated. Pt is GBS neg. She denies vaginal bleeding, contractions, discharge, leaking, and decreased fetal movement. Assessment/Plan  1. Gestation period, 40 weeks -  Admit to L&D. N/A for cervical ripening. Pitocin in AM.  GBS neg. EFM/[place].  AROM PRN. Epidural PRN. Anticipate . Z3A.40: 40 weeks gestation of pregnancy    2. Routine  care  Z34.83: Encounter for supervision of other normal pregnancy, third trimester      Return [facility] [address], MD for Surgery at [facility] () on 2023 at 06:00 AM  Prenatal Flowsheet:  Fundus Pres FHR FM PLS Cervix Exam BP Wt Edema Glucose Protein Leukocytes Nitrite Ketones Blood   07/15/2022   12 wks                           161               07/15/2022   12 wks   [email or web address]                          No   110/62 134 lbs trace         Comments: RM 2--U/S looks good US prior. Pt reports feeling well overall. Pt c/o nausea without vomiting, not sleeping well at night, extreme fatigue, mild cramping on the right side that extends down through her leg, minor swelling in her hands. . Pt denies HA, VB, abnormal D/C, LOF, contractions. Interested in [address] -called for cost: $250 but decided not to spend the $, advised she could do the [facility] home test since they mostly want to find out the gender. No other concerns. G/C and prenatal labs sent.  Declines CF/ SMA   08/10/2022   15 wks 5 days                           153 Yes   100/60 136 lbs trace         Comments: RM-2 Starting to feel kicks. started bleeding this morning, blood on sheets about 2 half dollar in size, brownish blood covering toilet paper, has been happening since 9 am also has complaints of uti and painful while urinating at the end. started drinking cranberry juice last night to help with pain of UTI. Cramping in back, light abdominal cramping. 08/10/2022   15 wks 5 days   [id number]                            0cm / 0% /            Comments: Thinks she has a UTI, urine culture sent and will start her on Macrobid. U/S today showing normal viable IUP but has a left CP cyst-reviewed in detail. On exam, scant brown discharge noted. Cx closed. Urged to monitor for now and call if sx worsen. Declines any other testing. Did [facility]--BOY   09/01/2022   18 wks 6 days   avaclavik                         151 Yes   118/68 134 lbs trace none 2+ 1+ Positive large trace   Comments: Rm 1: OB prob. Please see HPI. Suspect UTI --> but also some sx concerning for stone, but no severe pain. Will get UA & Cx. No CVA tenderness. Start Bactrim. Stone precautions reviewed. Pt in agreement with plan.   09/21/2022   21 wks 5 days                           135 Yes   102/64 139 lbs trace none neg       Comments: Rm 5 - US prior - adis normal, 12% growth so needs growth US in 4w. Doing well! Reports occasional HAs and swelling in hands in the mornings. Denies any N/V, bleeding/spotting or abnormal discharge. urine RADHA.   09/21/2022     [email or web address]                                        10/21/2022   26 wks   btozer                       26 cm Vertex 141 Yes   120/80 144 lbs trace         Comments: US prior - EFW 16%, normal fluid. Doing well; good FM. Denies n/v, bleeding or spotting. some HAs. Trouble sleeping - wakes in the middle of the night and her mind is racing. Sleep hygiene reviewed. Glucola, tdap next visit. 11/04/2022   28 wks   [email or web address]                       28 cm  134 Yes   116/72 147 lbs trace         Comments: Rm 6- Glucola (12:39) & TDAP (no latex allergy). Good FM. HAs in the morning after poor sleep, minor swelling in hands. Denies n/v, vb, and d/c. US next visit for sga   11/18/2022   30 wks                                          11/18/2022   30 wks   [id number]                       29 cm  155 Yes   114/74 148 lbs trace         Comments: RM 6- US prior. 26%, no further testing unless lagging FH_. Doing well. Consistent swelling in hands and ankles. May have sprained L ankle about 2wks ago, occasionally hurts. Good FM. Denies HA, n/v, vb, and d.c,   12/02/2022   32 wks   [email or web address]                       32 cm  147 Yes   130/74 148 lbs 1+         Comments: Rm 8- Doing well. Good FM. Has possible respiratory infection w/ sx starting on Tuesday; s/o tired, HAs, cough, nasal drainage, congestion is moving to chest, and wakes up with occ nose bleed. C/o increased swelling in hands/face/ankles, possible BHCs, back pain/sciatica that radiates down legs- mostly R leg. Denies n/v, vb, d/c, fever, and chills. Neg COVID test, med recommendations for relief. Will Rx Z-pack Not much weight gain but feels like she is eating enough. 12/16/2022   34 wks   avaclavik                       34 cm Vertex 143 Yes   114/66 150 lbs 1+         Comments: Doing well; good FM. Denies bleeding or spotting, LOF. She has been having n/v the past 2 days. She has tammy mondragon. Pre-reg info given. GBS next appt. Reviewed GERD may be causing nausea --> H2 blocker/PPI encouraged. RTC 2 weeks. 12/30/2022   36 wks   avaclavik                         136 Yes   114/70 148 lbs trace         Comments: Pt doing well. Reports irregular BHCs. Denies Has, N/V, VB, VD, LOF, and any other concerns. 01/06/2023   37 wks   avaclavik                       36 cm Vertex 140 Yes   110/68 149 lbs trace         Comments: Doing well. Active FM.  Occasional nausea and increase in BHCs. Denies any bleeding, HAs, discharge, or LOF. Reviewed pre-reg. Has link. 01/13/2023   38 wks   [email or web address]                       38 cm Vertex 135 Yes  3cm / 40% / -3 114/74 150 lbs trace         Comments: Doing well. Active FM. Increased BHCs and some tighter contractions. Denies any bleeding, n/v, HAs, discharge, or LOF. Reviewed labor precautions. Has pre-registered for [address] [place] [address]. Has everything ready for baby. Cervix 3/40/-3 today, fetal head well applied. 01/20/2023   39 wks   [email or web address]                       38 cm Vertex 130 Yes  3cm / 40% / -3 110/70 154 lbs 1+         Comments: Doing well, active fetal movement. Denies bleeding, LOF, abnormal discharge, n/v, vision changes. [address] [place] [address] Triage visit on 1/17 for contractions, unchanged cervix, given fluids and discharged. Occasional contractions now but nothing persistent. Cervix checked, membranes swept. Reviewed strict precautions, all questions answered. 01/27/2023   40 wks   avaclavik                        Vertex 145 Yes  3cm / 40% / -3 136/84 152 lbs 1+         Comments: Doing well, just tired. Increase in d/c on Saturday but had her membranes swept on Friday. IOL scheduled for Monday at [address] [place] [address]. Precautions reviewed. 01/29/2023     avaclavik                                        Allergies: Allergies not reviewed (last reviewed 01/13/2023)     NUT - UNSPECIFIED: Facial swelling - tongue swelling, trouble breathing    PENICILLINS: Anaphylaxis, Hives    Medications:  Medications not reviewed (last reviewed 01/27/2023)     EpiPen 2-Colt 0.3 mg/0.3 mL injection, auto-injector  Take 0.3 mL by injection route as needed for 1 day.  05/14/21   prescribed    Prenatal + DHA 03/31/21   entered    Vital Signs:  None recorded  Problems:  Reviewed Problems    Past Medical History  Asthma Y, As a child    Depression Y, With G2 during pregnancy only      Family History:  Family History not reviewed (last reviewed 01/27/2023)    Maternal Grandmother - Malignant tumor of breast  - Diagnosis age: Postmenopausal   Maternal Grandfather - Malignant tumor of lung   Paternal Grandmother - Diabetes mellitus   Social History:  Social History not reviewed (last reviewed 01/27/2023)    Substance Use  Do you or have you ever smoked tobacco?: Former smoker (Notes: Quit 2018!)  How much tobacco do you smoke?: None  Do you or have you ever used e-cigarettes or vape?: Former user of electronic cigarettes (Notes: Quit 2019!)  How many years have you smoked tobacco?: 7  Do you or have you ever used smokeless tobacco?: Never used smokeless tobacco  Which illicit or recreational drugs have you used?: None  How much tobacco do you chew?: none  What was the date of your most recent tobacco screening?: 19/05/7096  Public Health and Travel  Do you have symptoms associated with Zika virus (fever, rash, joint pain, or conjunctivitis)?: No  Are you currently sexually active with anyone who has traveled (within the last 12 weeks) to a Zika-affected area?: No  Have you had sexual relations with anyone who has been positively diagnosed with Zika virus within the last 6 months?: No  OB/GYN Social History  Do you feel safe in your current relationship?: Yes (Notes: \"Jorge\")  Are you working: Yes  On average, how many days per week do you engage in moderate to strenuous EXERCISE (like walking fast, running, jogging, dancing, swimming, biking, or other activities that cause a light or heavy sweat)?: 4 (Notes: walk)  How often do you have a DRINK containing ALCOHOL?: Never  Other  Are you planning to conceive with someone who has traveled (within the last 12 weeks) to a Zika-affected area?: No  Marital status:   Have you recently (within the last 12 weeks, or during a current pregnancy) traveled to or lived in a Zika-affected area?: No  Physical Exam  Constitutional:General Appearance: no acute distress.  Mental Status Findings oriented to time, place, and person and appropriate mood.    Head:Head: normocephalic. Respiratory:Lungs unlabored respiratory effort. Abdomen:Inspection and Palpation: gravid abdomen. Female :Cervix: as documented in prenatal flowsheet. Extremities:Extremities: no edema. Skin:General Skin no rash or suspicious lesions and normal general appearance. OB Labs    Result Value Ref.  Range Date Collected Date Reviewed Entered By Note   Initial Labs             Blood Type A  07/15/2022 07/17/2022 [email or web address]        D (Rh) Type Positive  07/15/2022 07/17/2022 [email or web address]        Antibody Screen Negative NEGATIVE 07/15/2022 07/17/2022 [email or web address]        Antibody Screen Negative NEGATIVE 11/04/2022 11/07/2022 [email or web address]        HCT - Initial 40.6 % 34.0-46.6 07/15/2022 07/17/2022 ymhezep29        HGB - Initial 13.8 g/dL 11.1-15.9 07/15/2022 07/17/2022 ybnwkgc97        MCV - Initial 88 fL 79-97 07/15/2022 07/17/2022 fupmezr81        PLT - Initial 238 x10E3/uL 150-450 07/15/2022 07/17/2022 zfijslg48        VDRL - Initial             Urine Culture/Screen Comment  08/10/2022 08/12/2022 [email or web address]        Urine Culture/Screen ESCHERICHIA COLI  09/01/2022 09/07/2022 avaclavik        Urine Culture/Screen NO GROWTH  09/21/2022 09/26/2022 dfnytlxl40        HBsAg Negative NEGATIVE 07/15/2022 07/17/2022 wgbryne20        HIV Counseling/Testing             Chlamydia - Initial Negative NEGATIVE 07/15/2022 07/18/2022 [email or web address]        Gonorrhea - Initial Negative NEGATIVE 07/15/2022 07/18/2022 asowllk27        Varicella             Rubella 9.28 index IMMUNE >0.99 07/15/2022 07/17/2022 dzefhbp98    Optional Labs             HGB Electrophoresis             PPD/Quanta             Pap Test             Cystic Fibrosis             HPV             Robin-Sachs             Familial Dysautonomia             Genetic Screening Tests             NST             TSH             Drug screen             HCV Ab   07/15/2022 07/17/2022 wtobzab24        HCV RNA             Urinalysis   09/01/2022 09/07/2022 avaclavik        Rhogam Injection         8-20 Week Labs             Ultrasound - Initial             1st Trimester Aneuploidy Risk Assessment             MSAFP/Multiple Markers             2nd Trimester Serum Screening             Amnio/CVS             Karyotype             Amniotic Fluid (AFP)         24-28 Week Labs             HCT - 24-28 Weeks 36.2 % 34.0-46.6 11/04/2022 11/07/2022 [id number]        HGB - 24-28 Weeks 12.1 G/DL 11.1-15.9 11/04/2022 11/07/2022 ryzavoaf16        MCV - 24-28 Weeks 88 FL 79-97 11/04/2022 11/07/2022 hxtijdvk00        PLT - 24-28 Weeks 222 X10E3/-450 11/04/2022 11/07/2022 tdtdyzsl21        Diabetes Screen 112 MG/DL  11/04/2022 11/07/2022 vlpidstm14        GTT (If Screen Abnormal)             D (Rh) Antibody Screen         32-36 Week Labs             HCT - 32-36 Weeks             HGB - 32-36 Weeks             MCV - 32-36 Weeks             PLT - 32-36 Weeks             Ultrasound - 32-36 Weeks             HIV (When Indicated)             VDRL - 32-36 Weeks             Gonorrhea - 32-36 Weeks             Chlamydia - 32-36 Weeks             Depression screening (when indicated)         35-37 Week Labs             Group B Strep             Resistance testing if penicillin allergic         Other Labs             Other         Vaccines  Vaccines not reviewed (last reviewed 07/15/2022)    Vaccine Type Date Amt. Route Site UlLore[address] Lot # Mfr. Exp.   Date VIS VIS  Given Vaccinator   Diphtheria, Tetanus, Pertussis   Tdap 11/04/22 0.5 mL Intramuscular Deltoid, Left  [id number] [employer] 11/10/24 Tdap 08/06/2021 11/04/22 Nimo Cocchiola                                                     Tdap 06/09/21 0.5 mL Intramuscular Arm, Right Upper  [id number] [employer] 04/16/23 Tdap 4/1/2020 06/09/21 Maurice Kins                                                     Influenza   influenza, seasonal, injectable 09/17/22                                                               Influenza, injectable, MDCK, preservative free, quadrivalent 08/26/20     [id number]                                                          influenza, injectable, quadrivalent 09/21/19     [id number]                                                          influenza, injectable, quadrivalent, preservative free 10/08/18     [id number]

## 2023-01-30 NOTE — ANESTHESIA POSTPROCEDURE EVALUATION
Post-Anesthesia Evaluation and Assessment    Patient: Ivan Paget MRN: [id number]  SSN: [id number]    YOB: 1995  Age: 28 y.o. Sex: female      I have evaluated the patient and they are stable and ready for discharge from the PACU. Cardiovascular Function/Vital Signs  Visit Vitals  /61   Pulse 71   Temp 36.6 °C (97.9 °F)   Resp 16   Ht 5' 1\" (1.549 m)   Wt 68.9 kg (152 lb)   SpO2 100%   Breastfeeding No   BMI 28.72 kg/m²       Patient is status post labor epidural for vaginal delivery     Nausea/Vomiting: None    Postoperative hydration reviewed and adequate. Pain:  Pain Scale 1: Numeric (0 - 10) (01/30/23 1800)  Pain Intensity 1: 0 (01/30/23 1800)   Managed    Neurological Status:   Neuro (WDL): Within Defined Limits (01/30/23 1800)   At baseline    Mental Status, Level of Consciousness: Alert and  oriented to person, place, and time    Pulmonary Status:   O2 Device: None (Room air) (01/30/23 1218)   Adequate oxygenation and airway patent    Complications related to anesthesia: None    Post-anesthesia assessment completed.  No concerns    Signed By: Kash Mendoza DO     January 30, 2023                * No procedures listed *.    epidural    <BSHSIANPOST>    INITIAL Post-op Vital signs:   Vitals Value Taken Time   /61 01/30/23 1846   Temp     Pulse 71 01/30/23 1846   Resp     SpO2

## 2023-01-30 NOTE — PROGRESS NOTES
~9764: The patient arrived ambulatory from home for scheduled induction. The patient and her significant other are escorted to L&D 10.  ~0740: Bedside and Verbal shift change report given to Garrison Ruffin RN by XU Paiz RN. Report included the following information SBAR, MAR, Accordion, and Recent Results.

## 2023-01-30 NOTE — PROGRESS NOTES
0740- Bedside and Verbal shift change report given to SREE Jordan RN (oncoming nurse) by Tae Menchaca RN (offgoing nurse). Report included the following information SBAR, Kardex, MAR, and Recent Results. 65- Dr Vinod Badillo at bedside, plan to continue pit and possibly AROM later this morning. [id number] epidural  1052-R side turned with peanut ball  1110- Dr Vinod Badillo to bedside SVE 4/60/-2 AROM clear fluid  1118- L side with peanut ball  1218 R sided exaggerated runners with peanut in tberg  1330- straight cathed for 100 cc urine  1336- L side spinning x 3 contractions  1347-R side spinning x 3 contractions  1359- L side lying with peanut ball between ankles  1433- Dr Vinod Badillo at bedside SVE 5/70/-1   IUPC placed  1540- R side lying with peanut ball  1619- patient in knee chest on cub  1645-straight cathed for 100cc clear urine  1651- patient nauseous and vomitting. Dr Vinod Badillo to bedside SVE swollen anterior lip  Benedryl and tums ordered  1700- R side lying in tberg with peanut between ankles  1745- Dr Vinod Badillo at bedside SVE 9.5/100/+1 with a reducible cervix  1748-  baby boy delivered to maternal chest  1930-Bedside and Verbal shift change report given to Maria Alejandra Bautista RN (oncoming nurse) by Jocelynn Malin RN (offgoing nurse). Report included the following information SBAR, Kardex, MAR, and Recent Results.

## 2023-01-30 NOTE — ANESTHESIA PREPROCEDURE EVALUATION
Relevant Problems   No relevant active problems       Anesthetic History   No history of anesthetic complications            Review of Systems / Medical History  Patient summary reviewed, nursing notes reviewed and pertinent labs reviewed    Pulmonary  Within defined limits                 Neuro/Psych   Within defined limits           Cardiovascular  Within defined limits                     GI/Hepatic/Renal  Within defined limits              Endo/Other  Within defined limits           Other Findings              Physical Exam    Airway  Mallampati: II  TM Distance: 4 - 6 cm  Neck ROM: normal range of motion   Mouth opening: Normal     Cardiovascular    Rhythm: regular  Rate: normal         Dental  No notable dental hx       Pulmonary  Breath sounds clear to auscultation               Abdominal  GI exam deferred       Other Findings            Anesthetic Plan    ASA: 2  Anesthesia type: epidural            Anesthetic plan and risks discussed with: Patient

## 2023-01-30 NOTE — ANESTHESIA PROCEDURE NOTES
Epidural Block    Patient location during procedure: OB  Start time: 1/30/2023 10:40 AM  End time: 1/30/2023 10:43 AM  Reason for block: labor epidural  Staffing  Performed: attending   Anesthesiologist: Kerri Lopez DO  Preanesthetic Checklist  Completed: patient identified, IV checked, site marked, risks and benefits discussed, surgical consent, monitors and equipment checked, pre-op evaluation and timeout performed  Block Placement  Patient position: sitting  Prep: DuraPrep  Sterility prep: cap, drape, gloves and mask  Sedation level: no sedation  Patient monitoring: continuous pulse oximetry and heart rate  Approach: midline  Location: lumbar  Lumbar location: L4-L5  Epidural  Loss of resistance technique: saline  Guidance: landmark technique  Needle  Needle type: Tuohy   Needle gauge: 17 G  Needle length: 9 cm  Needle insertion depth: 3.5 cm  Catheter type: end hole  Catheter size: 19 G  Catheter at skin depth: 10.5 cm  Catheter securement method: clear occlusive dressing, surgical tape and liquid medical adhesive  Test dose: negative  Medications Administered  lidocaine-EPINEPHrine (XYLOCAINE) 1.5 %-1:200,000 Epidural - Epidural   4.5 mL - 1/30/2023 10:40:00 AM  Assessment  Sensory level: T6  Block outcome: pain improved  Number of attempts: 1  Procedure assessment: patient tolerated procedure well with no immediate complications

## 2023-01-30 NOTE — L&D DELIVERY NOTE
Delivery Summary  Patient: Viviane Chapman             Circumcision:   desires  Additional Delivery Comments - Uncomplicated IOL for postdates. Patient progressed to anterior lip with swollen cervix that was able to be reduced. After this patient pushed once to deliver the vertex over an intact perineum. Body and shoulders followed. The cord was clamped after 1 minute. The placenta was delivered with gentle traction. At this point the cervix was inspected and noted to be intact but at the introitus. This was reviewed with the patient. No lacerations.       Information for the patient's :  [facility] [[id number]]     Delivery Type: Vaginal, Spontaneous   Delivery Date: 2023   Delivery Time: 5:48 PM     Birth Weight:       Sex:  male  Delivery Clinician:  Africa Stearns   Gestational Age: 44w3d    Presentation: Vertex   Position:             Apgars were 9  and 9      Resuscitation Method: Tactile Stimulation;Suctioning-bulb     Meconium Stained: None    Living Status: Living       Placenta Date/Time: 2023  5:57 PM   Placenta Removal: Spontaneous   Placenta Appearance: Normal    Cord Information:      Cord Events: None       Disposition of Cord Blood: Discard    Blood Gases Sent?:  No     Cord pH:  none    Episiotomy: None   Laceration(s): None     Estimated Blood Loss (ml): No data found 250mL    Labor Events  Method: Oxytocin      Augmentation:    Cervical Ripening:     None        Operative Vaginal Delivery - none

## 2023-01-31 PROCEDURE — 65410000002 HC RM PRIVATE OB

## 2023-01-31 PROCEDURE — 74011250637 HC RX REV CODE- 250/637: Performed by: OBSTETRICS & GYNECOLOGY

## 2023-01-31 RX ORDER — IBUPROFEN 800 MG/1
800 TABLET ORAL EVERY 8 HOURS
Qty: 30 TABLET | Refills: 1 | Status: SHIPPED | OUTPATIENT
Start: 2023-01-31

## 2023-01-31 RX ADMIN — IBUPROFEN 800 MG: 400 TABLET, FILM COATED ORAL at 12:57

## 2023-01-31 RX ADMIN — IBUPROFEN 800 MG: 400 TABLET, FILM COATED ORAL at 04:39

## 2023-01-31 RX ADMIN — IBUPROFEN 800 MG: 400 TABLET, FILM COATED ORAL at 20:58

## 2023-01-31 NOTE — PROGRESS NOTES
1935 Report received from [facility]    2010 TRANSFER - OUT REPORT:    Verbal report given to CHRISTOPHE Rodarte Rn(name) on Shyam Burroughs  being transferred to MIU (unit) for routine progression of care       Report consisted of patients Situation, Background, Assessment and   Recommendations(SBAR). Information from the following report(s) SBAR, Intake/Output, MAR, Recent Results, and Med Rec Status was reviewed with the receiving nurse. Lines:   Peripheral IV 01/30/23 Posterior;Right Hand (Active)   Site Assessment Clean, dry, & intact 01/30/23 2025   Phlebitis Assessment 0 01/30/23 2025   Infiltration Assessment 0 01/30/23 2025   Dressing Status Clean, dry, & intact 01/30/23 2025   Dressing Type Tape;Transparent 01/30/23 2025   Hub Color/Line Status Pink;Capped 01/30/23 2025   Action Taken Open ports on tubing capped 01/30/23 2025   Alcohol Cap Used Yes 01/30/23 2025        Opportunity for questions and clarification was provided. Patient transported with:   Registered Nurse via wheel chair in stable condition with belongings and baby transferred with mother instable condition.

## 2023-01-31 NOTE — LACTATION NOTE
This note was copied from a baby's chart. Infant born vaginally yesterday afternoon to a  mom at 40 3/7 weeks gestation. Mom nursed her other children for a short time, switching to formula when she went to work. She now works at home and plans to nurse longer with this child. Infant sleeping at the time of my visit; mom states feedings are going well and she is using the nipple shield. She states she can see evidence of colostrum in the nipple shield after infant nurses. Mom expresses confidence in breastfeeding; encouraged her to call for assistance as needed.

## 2023-01-31 NOTE — PROGRESS NOTES
Post-Partum Day Number 1 Progress Note    Jamison Zamudio     Assessment: Doing well, post partum day 1    Plan:  - Continue routine postpartum and perineal care as well as maternal education.  - The risks and benefits of the circumcision  procedure and anesthesia including: bleeding, infection, variability of cosmetic results were discussed at length with the mother. She is aware that future repeat procedures may be necessary. She gives informed consent to proceed as noted and her questions are answered. - Plan discharge home tomorrow    Information for the patient's :  Aristeo Hutchinson [[id number]]   Vaginal, Spontaneous  Patient doing well without significant complaint. Voiding without difficulty, normal lochia. Vitals:  Visit Vitals  /64 (BP 1 Location: Left arm, BP Patient Position: At rest)   Pulse 75   Temp 97.9 °F (36.6 °C)   Resp 16   Ht 5' 1\" (1.549 m)   Wt 68.9 kg (152 lb)   SpO2 98%   Breastfeeding Unknown   BMI 28.72 kg/m²     Temp (24hrs), Av.2 °F (36.8 °C), Min:97.7 °F (36.5 °C), Max:98.9 °F (37.2 °C)        Exam:   Patient without distress. Fundus firm, nontender per nursing fundal checks. Perineum with normal lochia noted per nursing assessment. Lower extremities are negative for pathological edema.     Labs:     Lab Results   Component Value Date/Time    WBC 6.1 2023 06:39 AM    WBC 7.6 2023 12:11 AM    WBC 9.7 2021 01:43 AM    WBC 6.9 2021 04:27 PM    HGB 10.4 (L) 2023 06:39 AM    HGB 11.9 2023 12:11 AM    HGB 10.2 (L) 2021 06:01 AM    HGB 10.3 (L) 2021 01:43 AM    HGB 13.8 2021 04:27 PM    HCT 32.9 (L) 2023 06:39 AM    HCT 37.7 2023 12:11 AM    HCT 33.6 (L) 2021 06:01 AM    HCT 36.8 2021 01:43 AM    HCT 40.1 2021 04:27 PM    PLATELET 496  06:39 AM    PLATELET 520  12:11 AM    PLATELET 602  01:43 AM    PLATELET 096 02/08/2021 04:27 PM       No results found for this or any previous visit (from the past 24 hour(s)).

## 2023-01-31 NOTE — PROGRESS NOTES
Bedside and Verbal shift change report given to CHRISTOPHE Vasquez RN (oncoming nurse) by NATALIE Stallings RN (offgoing nurse). Report included the following information SBAR, Kardex, Intake/Output, MAR, and Recent Results.

## 2023-01-31 NOTE — ROUTINE PROCESS
Bedside and Verbal shift change report given to LENORA Holder RN (oncoming nurse) by CHRISTOPHE Fernandez (offgoing nurse). Report included the following information SBAR.

## 2023-01-31 NOTE — DISCHARGE SUMMARY
Obstetrical Discharge Summary     Name: Jamison Zamudio MRN: [id number]  SSN: [id number]    YOB: 1995  Age: 28 y.o. Sex: female      Admit Date: 2023    Discharge Date: 2023    Admitting Physician: Ja Wilhelm MD     Attending Physician:  Marylen Ehlers, MD     Admission Diagnoses: 40 weeks gestation of pregnancy [Z3A.40]    Discharge Diagnoses:   Information for the patient's :  Aristeo Hutchinson [[id number]]   Delivery of a 3.48 kg male infant via Vaginal, Spontaneous on 2023 at 5:48 PM  by Ja Wilhelm. Apgars were 9  and 9 . Additional Diagnoses:   Hospital Problems  Date Reviewed: 3/10/2021            Codes Class Noted POA    40 weeks gestation of pregnancy ICD-10-CM: Z3A.40  ICD-9-CM: V22.2  2023 Unknown          Lab Results   Component Value Date/Time    Rubella, External 9.28 07/15/2022 12:00 AM    GrBStrep, External negative 2022 12:00 AM       Hospital Course: Normal hospital course following the delivery. Patient Instructions:   Current Discharge Medication List        START taking these medications    Details   ibuprofen (MOTRIN) 800 mg tablet Take 1 Tablet by mouth every eight (8) hours. Qty: 30 Tablet, Refills: 1           CONTINUE these medications which have NOT CHANGED    Details   PNV Comb #2-Iron-Omega 3-FA [id number] mg cmpk Take  by mouth. Disposition at Discharge: Home or self care    Condition at Discharge: Stable    Reference my discharge instructions.     Follow-up Appointments   Procedures    FOLLOW UP VISIT Appointment in: 6 Weeks At [facility]     At [facility]     Standing Status:   Standing     Number of Occurrences:   1     Order Specific Question:   Appointment in     Answer:   6 Weeks        Signed By:  Doc Azar MD     2023

## 2023-02-01 VITALS
TEMPERATURE: 97.6 F | BODY MASS INDEX: 28.7 KG/M2 | WEIGHT: 152 LBS | OXYGEN SATURATION: 96 % | SYSTOLIC BLOOD PRESSURE: 106 MMHG | HEART RATE: 72 BPM | DIASTOLIC BLOOD PRESSURE: 55 MMHG | RESPIRATION RATE: 16 BRPM | HEIGHT: 61 IN

## 2023-02-01 PROCEDURE — 74011250637 HC RX REV CODE- 250/637: Performed by: OBSTETRICS & GYNECOLOGY

## 2023-02-01 RX ADMIN — IBUPROFEN 800 MG: 400 TABLET, FILM COATED ORAL at 04:56

## 2023-02-01 NOTE — PROGRESS NOTES
Bedside shift change report given to MARIA E Moraes RN (oncoming nurse) by CHRISTOPHE Urena (offgoing nurse). Report included the following information SBAR.   1140:  I discussed discharge paperwork with pt

## 2023-02-01 NOTE — PROGRESS NOTES
Bedside shift change report given to CHRISTOPHE Simms RN (oncoming nurse) by Mindy Taylor RN (offgoing nurse). Report included the following information SBAR and Kardex.

## 2023-02-01 NOTE — LACTATION NOTE
This note was copied from a baby's chart. Mom and baby scheduled for discharge today. Mom states baby has been latching and nursing well. She said she has been starting with the nipple shield and then baby can latch directly to the breast.     I helped mom with a feeding this morning. I reviewed with mom positioning the baby at the breast and how she can help baby get a deep latch. We were able to get him latched deeply in the cross cradle hold. He was sucking rhythmically with frequent, audible swallows. We reviewed cluster feeding, engorgement and pumping. Breastfeeding teaching completed and all questions answered.

## 2023-02-01 NOTE — PROGRESS NOTES
Post-Partum Day Number 2 Progress Note    Connor Oquendo     Assessment: Doing well, post partum day 2    Plan:   - Discharge home today  - Follow up in office in 6 week(s) with [facility].  - Pain medication prescription(s) sent. - Questions answered. Information for the patient's :  Zachery Lux [[id number]]   Vaginal, Spontaneous  Patient doing well without significant complaint. Voiding without difficulty, normal lochia. Ready for discharge home. Vitals:  Visit Vitals  /71 (BP 1 Location: Left arm, BP Patient Position: At rest)   Pulse 80   Temp 98 °F (36.7 °C)   Resp 16   Ht 5' 1\" (1.549 m)   Wt 68.9 kg (152 lb)   SpO2 96%   Breastfeeding Unknown   BMI 28.72 kg/m²     Temp (24hrs), Av.2 °F (36.8 °C), Min:97.9 °F (36.6 °C), Max:98.5 °F (36.9 °C)      Exam:        Patient without distress. Fundus firm, nontender per nursing fundal checks                Perineum with normal lochia noted per nursing assessment                Lower extremities are negative for pathological edema    Labs:     Lab Results   Component Value Date/Time    WBC 6.1 2023 06:39 AM    WBC 7.6 2023 12:11 AM    WBC 9.7 2021 01:43 AM    WBC 6.9 2021 04:27 PM    HGB 10.4 (L) 2023 06:39 AM    HGB 11.9 2023 12:11 AM    HGB 10.2 (L) 2021 06:01 AM    HGB 10.3 (L) 2021 01:43 AM    HGB 13.8 2021 04:27 PM    HCT 32.9 (L) 2023 06:39 AM    HCT 37.7 2023 12:11 AM    HCT 33.6 (L) 2021 06:01 AM    HCT 36.8 2021 01:43 AM    HCT 40.1 2021 04:27 PM    PLATELET 065  06:39 AM    PLATELET 635  12:11 AM    PLATELET 221 15/36/6761 01:43 AM    PLATELET 271  04:27 PM       No results found for this or any previous visit (from the past 24 hour(s)).

## 2023-05-19 RX ORDER — IBUPROFEN 800 MG/1
800 TABLET ORAL EVERY 8 HOURS
COMMUNITY
Start: 2023-01-31